# Patient Record
Sex: MALE | URBAN - METROPOLITAN AREA
[De-identification: names, ages, dates, MRNs, and addresses within clinical notes are randomized per-mention and may not be internally consistent; named-entity substitution may affect disease eponyms.]

---

## 2018-02-17 ENCOUNTER — IMPORTED ENCOUNTER (OUTPATIENT)
Dept: URBAN - METROPOLITAN AREA CLINIC 1 | Facility: CLINIC | Age: 68
End: 2018-02-17

## 2018-02-17 PROBLEM — H25.812: Noted: 2018-02-17

## 2018-02-17 PROBLEM — H43.811: Noted: 2018-02-17

## 2018-02-17 PROBLEM — Z96.1: Noted: 2018-02-17

## 2018-02-17 PROBLEM — H26.491: Noted: 2018-02-17

## 2018-02-17 PROBLEM — H40.053: Noted: 2018-02-17

## 2018-02-17 PROCEDURE — 92015 DETERMINE REFRACTIVE STATE: CPT

## 2018-02-17 PROCEDURE — 92004 COMPRE OPH EXAM NEW PT 1/>: CPT

## 2018-02-17 NOTE — PATIENT DISCUSSION
1.  Cataract OS: Visually Significant secondary to glare discussed the risks benefits alternatives and limitations of cataract surgery. The patient stated a full understanding and a desire to proceed with the procedure. The patient will need to return for preop appointment with cataract measurements and to have any additional questions answered and start pre-operative eye drops as directed. Phaco PCL OS (Otherwise follow-up 6 mo 10 glare) 2. PCO OD- Visually significant secondary to glare; Schedule YAG Cap OD. Pros cons risks and benefits. 3.  Pseudophakia OD - (SN60WF 23.5- [place]) 4. PVD w/o Tear OD - RD precautions. 5.  OHTN OU (CD .35/. 3) IOP borderline. Re-evaluate after Phaco. Return for an appointment with Dr. Dirk Colorado for AS/HP.  Schedule YAG Cap OD on Ascan day! (Tickler sent to [facility])

## 2018-03-05 ENCOUNTER — OFFICE VISIT (OUTPATIENT)
Dept: FAMILY MEDICINE CLINIC | Age: 68
End: 2018-03-05

## 2018-03-05 VITALS
DIASTOLIC BLOOD PRESSURE: 93 MMHG | RESPIRATION RATE: 16 BRPM | HEART RATE: 71 BPM | HEIGHT: 60 IN | OXYGEN SATURATION: 96 % | SYSTOLIC BLOOD PRESSURE: 136 MMHG | WEIGHT: 129 LBS | BODY MASS INDEX: 25.32 KG/M2 | TEMPERATURE: 96.2 F

## 2018-03-05 DIAGNOSIS — M25.512 CHRONIC PAIN OF BOTH SHOULDERS: Primary | ICD-10-CM

## 2018-03-05 DIAGNOSIS — M25.511 CHRONIC PAIN OF BOTH SHOULDERS: Primary | ICD-10-CM

## 2018-03-05 DIAGNOSIS — F17.200 SMOKER: ICD-10-CM

## 2018-03-05 DIAGNOSIS — G89.29 CHRONIC PAIN OF BOTH SHOULDERS: Primary | ICD-10-CM

## 2018-03-05 DIAGNOSIS — M62.838 MUSCLE SPASM: ICD-10-CM

## 2018-03-05 DIAGNOSIS — M51.36 DEGENERATIVE DISC DISEASE, LUMBAR: ICD-10-CM

## 2018-03-05 RX ORDER — METHOCARBAMOL 500 MG/1
500 TABLET, FILM COATED ORAL
Qty: 30 TAB | Refills: 2 | Status: SHIPPED | OUTPATIENT
Start: 2018-03-05 | End: 2018-03-09

## 2018-03-05 RX ORDER — CLOPIDOGREL BISULFATE 75 MG/1
TABLET ORAL
COMMUNITY
End: 2018-05-23 | Stop reason: SDUPTHER

## 2018-03-05 RX ORDER — HYDROCODONE BITARTRATE AND ACETAMINOPHEN 7.5; 325 MG/1; MG/1
1 TABLET ORAL
Qty: 6 TAB | Refills: 0 | Status: SHIPPED | OUTPATIENT
Start: 2018-03-05 | End: 2018-03-09

## 2018-03-05 NOTE — PATIENT INSTRUCTIONS
For the shoulders, I've referred you to orthopedics. If you don't hear from them, let me know. In the meantime, I've given you a couple of pain pills to use when absolutely necessary. I have also prescribed a muscle relaxer, take it as directed. Recheck as needed. If you stop smoking it will also help your joints and back.

## 2018-03-05 NOTE — PROGRESS NOTES
HISTORY OF PRESENT ILLNESS  Baljeet Dawson is a 77 y.o. male. HPI Comments: Mr. Noel Becker is here today for an urgent care visit because of bad shoulder pain. He's had problems for years, many years ago he had a cortisone injection that helped a lot and would like another one. Both shoulders hurt but the R is worse than the L. He's been taking Motrin 800 when the pain gets bad. He also has chronic back pain. He's had surgery and after that he's had 4 epidurals. Other than mentioning it, he doesn't need anything for it. He asks for a muscle relaxer. His muscles get sore in his shoulders, arms and back. He smokes 1/2 pack per day (40 years). Shoulder Pain          Review of Systems   Constitutional: Negative for chills and fever. Eyes: Negative for blurred vision and double vision. Respiratory: Negative for cough and shortness of breath. Cardiovascular: Negative for chest pain and palpitations. Gastrointestinal: Negative for abdominal pain, nausea and vomiting. Genitourinary: Negative for dysuria. Musculoskeletal: Positive for back pain, joint pain (shoulders) and myalgias. Neurological: Negative for headaches. Physical Exam   Constitutional: He is oriented to person, place, and time. He appears well-developed and well-nourished. Eyes: Pupils are equal, round, and reactive to light. Neck: Neck supple. Cardiovascular: Normal rate and regular rhythm. Pulmonary/Chest: Effort normal and breath sounds normal. No respiratory distress. He has no wheezes. He has no rales. Abdominal: Soft. He exhibits no distension. Musculoskeletal:   R shoulder tender anteriorly, decreased ROM. Lymphadenopathy:     He has no cervical adenopathy. Neurological: He is alert and oriented to person, place, and time. Nursing note and vitals reviewed. ASSESSMENT and PLAN    ICD-10-CM ICD-9-CM    1.  Chronic pain of both shoulders M25.511 719.41 REFERRAL TO ORTHOPEDICS    G89.29 338.29 HYDROcodone-acetaminophen (NORCO) 7.5-325 mg per tablet    M25.512     2. Degenerative disc disease, lumbar M51.36 722.52 methocarbamol (ROBAXIN) 500 mg tablet   3. Muscle spasm M62.838 728.85 methocarbamol (ROBAXIN) 500 mg tablet   4. Smoker F17.200 305.1        Refer to ortho. Encouraged to quit smoking.     AVS instructions reviewed with patient, pt verbalized understanding

## 2018-03-05 NOTE — PROGRESS NOTES
rm :1    Chief Complaint   Patient presents with    Shoulder Pain     bilateral pain in shoulder that has been ocurring for the last 2 months, more so in the right     Flu Shot Requested: no    Depression Screening:  PHQ over the last two weeks 3/5/2018   Little interest or pleasure in doing things Not at all   Feeling down, depressed or hopeless Not at all   Total Score PHQ 2 0       Learning Assessment:  Learning Assessment 3/5/2018   PRIMARY LEARNER Patient   HIGHEST LEVEL OF EDUCATION - PRIMARY LEARNER  DID NOT GRADUATE HIGH SCHOOL   PRIMARY LANGUAGE ENGLISH   LEARNER PREFERENCE PRIMARY READING   ANSWERED BY patient   RELATIONSHIP SELF       Abuse Screening:  No flowsheet data found. Health Maintenance reviewed and discussed per provider: yes     Coordination of Care:    1. Have you been to the ER, urgent care clinic since your last visit? Hospitalized since your last visit? no    2. Have you seen or consulted any other health care providers outside of the [address] [place] [address] since your last visit? Include any pap smears or colon screening.  no

## 2018-03-05 NOTE — MR AVS SNAPSHOT
[address] [place] [address] [phone] [id number] [phone] Patient: Diann Young MRN: [id number] WLL:[birth year] Visit Information Date & Time Provider Department Dept. Phone Encounter #  
 3/5/2018  1:30 PM Oriana Huang, [facility] [phone] [id number] Upcoming Health Maintenance Date Due DTaP/Tdap/Td series (1 - Tdap) 2/20/1969 FOBT Q 1 YEAR AGE 50-75 2/20/1998 ZOSTER VACCINE AGE 60> 12/20/2007 GLAUCOMA SCREENING Q2Y 2/20/2013 Pneumococcal 65+ Low/Medium Risk (1 of 2 - PCV13) 2/20/2013 MEDICARE YEARLY EXAM 2/20/2013 Influenza Age 5 to Adult 8/1/2017 Allergies as of 3/5/2018  Review Complete On: 3/5/2018 By: Oriana Huang MD  
  
 Severity Noted Reaction Type Reactions Keflex [Cephalexin]  03/05/2018    Hives Morphine  03/05/2018    Hives Current Immunizations  Never Reviewed No immunizations on file. Not reviewed this visit You Were Diagnosed With   
  
 Codes Comments Chronic pain of both shoulders    -  Primary ICD-10-CM: M25.511, G89.29, M25.512 ICD-9-CM: 719.41, 338.29 Degenerative disc disease, lumbar     ICD-10-CM: M51.36 
ICD-9-CM: 722.52 Muscle spasm     ICD-10-CM: D58.433 ICD-9-CM: 728.85 Smoker     ICD-10-CM: N60.521 ICD-9-CM: 305.1 Vitals BP Pulse Temp Resp Height(growth percentile) Weight(growth percentile) (!) 136/93 (BP 1 Location: Left arm, BP Patient Position: Sitting) 71 96.2 °F (35.7 °C) (Oral) 16 4' 9\" (1.448 m) 129 lb (58.5 kg) SpO2 BMI Smoking Status 96% 27.92 kg/m2 Current Every Day Smoker Vitals History BMI and BSA Data Body Mass Index Body Surface Area  
 27.92 kg/m 2 1.53 m 2 Preferred Pharmacy Pharmacy Name Phone [facility] [address] [place] [address] Arlene Mckenzie Your Updated Medication List  
  
   
 This list is accurate as of 3/5/18  2:47 PM.  Always use your most recent med list.  
  
  
  
  
 HYDROcodone-acetaminophen 7.5-325 mg per tablet Commonly known as:  Mertie Moran Take 1 Tab by mouth daily as needed for Pain. methocarbamol 500 mg tablet Commonly known as:  ROBAXIN Take 1 Tab by mouth three (3) times daily as needed. PLAVIX 75 mg Tab Generic drug:  clopidogrel Take  by mouth. Prescriptions Printed Refills HYDROcodone-acetaminophen (NORCO) 7.5-325 mg per tablet 0 Sig: Take 1 Tab by mouth daily as needed for Pain. Class: Print Route: Oral  
  
Prescriptions Sent to Pharmacy Refills  
 methocarbamol (ROBAXIN) 500 mg tablet 2 Sig: Take 1 Tab by mouth three (3) times daily as needed. Class: Normal  
 Pharmacy: [facility] [address] [place] [address] #: [phone] Route: Oral  
  
We Performed the Following REFERRAL TO ORTHOPEDICS [DAL050 Custom] Referral Information Referral ID Referred By Referred To  
  
 [id number] STEF [address] [place] [address], MD   
   [address] [place] [address]   
   Hannah Arguello, [address] Phone: [phone] Fax: [phone] Visits Status Start Date End Date 1 New Request 3/5/18 3/5/19 If your referral has a status of pending review or denied, additional information will be sent to support the outcome of this decision. Patient Instructions For the shoulders, I've referred you to orthopedics. If you don't hear from them, let me know. In the meantime, I've given you a couple of pain pills to use when absolutely necessary. I have also prescribed a muscle relaxer, take it as directed. Recheck as needed. If you stop smoking it will also help your joints and back. Introducing [facility]!    
 [facility] introduces [employer] patient portal. Now you can access parts of your medical record, email your doctor's office, and request medication refills online. 1. In your internet browser, go to [email or web address] 2. Click on the First Time User? Click Here link in the Sign In box. You will see the New Member Sign Up page. 3. Enter your SlideMail Access Code exactly as it appears below. You will not need to use this code after youve completed the sign-up process. If you do not sign up before the expiration date, you must request a new code. · SlideMail Access Code: [email or web address] Expires: 6/3/2018  2:47 PM 
 
4. Enter the last four digits of your Social Security Number (xxxx) and Date of Birth (mm/dd/yyyy) as indicated and click Submit. You will be taken to the next sign-up page. 5. Create a SlideMail ID. This will be your SlideMail login ID and cannot be changed, so think of one that is secure and easy to remember. 6. Create a SlideMail password. You can change your password at any time. 7. Enter your Password Reset Question and Answer. This can be used at a later time if you forget your password. 8. Enter your e-mail address. You will receive e-mail notification when new information is available in [address] 9. Click Sign Up. You can now view and download portions of your medical record. 10. Click the Download Summary menu link to download a portable copy of your medical information. If you have questions, please visit the Frequently Asked Questions section of the SlideMail website. Remember, SlideMail is NOT to be used for urgent needs. For medical emergencies, dial 911. Now available from your iPhone and Android! Please provide this summary of care documentation to your next provider. If you have any questions after today's visit, please call [phone].

## 2018-03-06 ENCOUNTER — IMPORTED ENCOUNTER (OUTPATIENT)
Dept: URBAN - METROPOLITAN AREA CLINIC 1 | Facility: CLINIC | Age: 68
End: 2018-03-06

## 2018-03-06 PROCEDURE — 92136 OPHTHALMIC BIOMETRY: CPT

## 2018-03-06 PROCEDURE — 66821 AFTER CATARACT LASER SURGERY: CPT

## 2018-03-06 NOTE — PATIENT DISCUSSION
1.  Cataract OS:  Visually Significant secondary to glare discussed the risks benefits alternatives and limitations of cataract surgery. The patient stated a full understanding and a desire to proceed with the procedure. Instructed/ discussed with patient if gtts are expensive (over 150 dollars) to call our office so we can recommend alternatives. Pt understood. and Pt understands they will need glasses post-op to achieve their best corrected vision. Phaco PCL2. YAG CAP OD: (Consent signed and scanned into attachments) 1 gtt Prolensa applied. The purpose and nature of the procedure possible alternative methods of treatment the risks involved and the possibility of complications were discussed with patient. The Patient wishes to proceed and the consent was signed. The laser was then performed under topical anesthesia with no complications. Post op instructions were given to patient as well as a follow-up appointment. Patient was advised to call our office if any questions or concerns.  F/u as scheduled

## 2018-03-08 PROBLEM — H25.812: Noted: 2018-03-08

## 2018-03-08 PROBLEM — H26.491: Noted: 2018-03-06

## 2018-03-14 ENCOUNTER — IMPORTED ENCOUNTER (OUTPATIENT)
Dept: URBAN - METROPOLITAN AREA CLINIC 1 | Facility: CLINIC | Age: 68
End: 2018-03-14

## 2018-03-14 PROBLEM — H25.812 COMBINED FORM OF SENILE CATARACT OF LEFT EYE: Status: ACTIVE | Noted: 2018-03-14

## 2018-03-14 PROBLEM — H25.812 COMBINED FORM OF SENILE CATARACT OF LEFT EYE: Status: RESOLVED | Noted: 2018-03-14 | Resolved: 2018-03-14

## 2018-03-15 ENCOUNTER — IMPORTED ENCOUNTER (OUTPATIENT)
Dept: URBAN - METROPOLITAN AREA CLINIC 1 | Facility: CLINIC | Age: 68
End: 2018-03-15

## 2018-03-15 PROBLEM — Z96.1: Noted: 2018-03-15

## 2018-03-15 PROCEDURE — 99024 POSTOP FOLLOW-UP VISIT: CPT

## 2018-03-15 NOTE — PATIENT DISCUSSION
POD#1 CE/IOL OS (Standard) doing well. Continue all 3 gtts as prescribed and until gone. Use Lotemax BID OS Prolensa Qdaily OS Ocuflox TID OS Begin Combigan BID OS (Sample given) 1 gtt Combigan applied OS prior to leaving. Written instructions given and explained. Patient having FBS OD>OS.  Use ATs TID OU Routinely (sample of Blink given) Post op Warnings Reiterated RTC as scheduled

## 2018-03-16 ENCOUNTER — OFFICE VISIT (OUTPATIENT)
Dept: ORTHOPEDIC SURGERY | Facility: CLINIC | Age: 68
End: 2018-03-16

## 2018-03-16 VITALS
HEART RATE: 55 BPM | HEIGHT: 60 IN | WEIGHT: 122 LBS | OXYGEN SATURATION: 97 % | SYSTOLIC BLOOD PRESSURE: 133 MMHG | DIASTOLIC BLOOD PRESSURE: 83 MMHG | BODY MASS INDEX: 23.95 KG/M2 | TEMPERATURE: 97.5 F

## 2018-03-16 DIAGNOSIS — R20.2 NUMBNESS AND TINGLING IN BOTH HANDS: ICD-10-CM

## 2018-03-16 DIAGNOSIS — G89.29 CHRONIC LEFT SHOULDER PAIN: ICD-10-CM

## 2018-03-16 DIAGNOSIS — M75.22 BICEPS TENDONITIS OF BOTH SHOULDERS: ICD-10-CM

## 2018-03-16 DIAGNOSIS — M19.011 PRIMARY OSTEOARTHRITIS OF RIGHT SHOULDER: ICD-10-CM

## 2018-03-16 DIAGNOSIS — Z98.1 S/P LUMBAR FUSION: ICD-10-CM

## 2018-03-16 DIAGNOSIS — M25.512 CHRONIC LEFT SHOULDER PAIN: ICD-10-CM

## 2018-03-16 DIAGNOSIS — M75.52 CHRONIC SHOULDER BURSITIS, LEFT: ICD-10-CM

## 2018-03-16 DIAGNOSIS — R20.0 NUMBNESS AND TINGLING IN BOTH HANDS: ICD-10-CM

## 2018-03-16 DIAGNOSIS — M75.21 BICEPS TENDONITIS OF BOTH SHOULDERS: ICD-10-CM

## 2018-03-16 DIAGNOSIS — M25.511 CHRONIC RIGHT SHOULDER PAIN: ICD-10-CM

## 2018-03-16 DIAGNOSIS — G89.29 CHRONIC RIGHT SHOULDER PAIN: ICD-10-CM

## 2018-03-16 DIAGNOSIS — M75.51 CHRONIC SHOULDER BURSITIS, RIGHT: ICD-10-CM

## 2018-03-16 DIAGNOSIS — M19.012 PRIMARY OSTEOARTHRITIS OF LEFT SHOULDER: Primary | ICD-10-CM

## 2018-03-16 RX ORDER — BETAMETHASONE SODIUM PHOSPHATE AND BETAMETHASONE ACETATE 3; 3 MG/ML; MG/ML
6 INJECTION, SUSPENSION INTRA-ARTICULAR; INTRALESIONAL; INTRAMUSCULAR; SOFT TISSUE ONCE
Qty: 0.5 ML | Refills: 0
Start: 2018-03-16 | End: 2018-03-16

## 2018-03-16 RX ORDER — BUPIVACAINE HYDROCHLORIDE 2.5 MG/ML
8 INJECTION, SOLUTION EPIDURAL; INFILTRATION; INTRACAUDAL ONCE
Qty: 8 ML | Refills: 0
Start: 2018-03-16 | End: 2018-03-16

## 2018-03-16 NOTE — PROGRESS NOTES
Patient: Diann Young                MRN: [id number]       SSN: [id number]  YOB: 1950        AGE: 77 y.o. SEX: male    PCP: None  03/16/18    Chief Complaint   Patient presents with    Shoulder Pain     Bilateral shoulder pain     HISTORY:  Diann Young is a 77 y.o. male who is seen for bilateral shoulder R>L and hand pain. He reports shoulder pain for some time with no history of injury. He notes numbness and tingling in both hands. He reports a h/o carpal tunnel syndrome. He states he has pain in his wrist and hands. He notes pain radiating up to his right elbow. He states his right shoulder pain radiates into his pectoralis area. He reports increased pain at night and difficulty sleeping on his side. He is s/p lumbar fusion 18 years ago by Dr. Sasha George. He reports relief from a previous right shoulder cortisone injection in [place]. 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 He stopped working after his lumbar fusion 18 years ago. He is originally from [place] and also lived in [place] for many years. She moved to this area 2 years ago to be closer to his ill sister. He lives in [place] with his wife. He has four children- 1 daughter and 3 sons. He has one son that lives in the area. He has 15 grandchildren and 10 great-grandchildren. Current weight is 122 pounds. He is 4'9\" tall. He is hypertensive. He is not diabetic. He is right handed.      No results found for: HBA1C, HGBE8, BUU6FDBT, GJR8WUBH, WQQ7UTTB  Weight Metrics 3/16/2018 3/14/2018 3/9/2018 3/5/2018   Weight 122 lb - 118 lb 129 lb   BMI 26.4 kg/m2 25.53 kg/m2 - 27.92 kg/m2       Patient Active Problem List   Diagnosis Code   (none) - all problems resolved or deleted     REVIEW OF SYSTEMS: All Below are Negative except: See HPI   Constitutional: negative for fever, chills, and weight loss. Cardiovascular: negative for chest pain, claudication, leg swelling, SOB, JAIN   Gastrointestinal: Negative for pain, N/V/C/D, Blood in stool or urine, dysuria,  hematuria, incontinence, pelvic pain. Musculoskeletal: See HPI   Neurological: Negative for dizziness and weakness. Negative for headaches, Visual changes, confusion, seizures   Phychiatric/Behavioral: Negative for depression, memory loss, substance  abuse. Extremities: Negative for hair changes, rash, or skin lesion changes. Hematologic: Negative for bleeding problems, bruising, pallor or swollen lymph  nodes   Peripheral Vascular: No calf pain, no circulation deficits. Social History     Social History    Marital status: UNKNOWN     Spouse name: N/A    Number of children: N/A    Years of education: N/A     Occupational History    Not on file. Social History Main Topics    Smoking status: Current Every Day Smoker     Packs/day: 0.50     Years: 40.00    Smokeless tobacco: Never Used    Alcohol use No    Drug use: No    Sexual activity: No     Other Topics Concern    Not on file     Social History Narrative      Allergies   Allergen Reactions    Keflex [Cephalexin] Hives    Morphine Hives      Current Outpatient Prescriptions   Medication Sig    betamethasone (CELESTONE SOLUSPAN) 6 mg/mL injection 1 mL by Intra artICUlar route once for 1 dose.  bupivacaine, PF, (MARCAINE, PF,) 0.25 % (2.5 mg/mL) injection 8 mL by Intra artICUlar route once for 1 dose.  METOPROLOL SUCCINATE PO Take  by mouth.  LISINOPRIL PO Take  by mouth.  ROSUVASTATIN CALCIUM (CRESTOR PO) Take  by mouth.  OMEPRAZOLE PO Take  by mouth.  TAMSULOSIN HCL (TAMSULOSIN PO) Take  by mouth.     TIOTROPIUM BR/OLODATEROL HCL (STIOLTO RESPIMAT IN) Take by inhalation.  albuterol sulfate (PROVENTIL;VENTOLIN) 2.5 mg/0.5 mL nebu nebulizer solution by Nebulization route once.  clopidogrel (PLAVIX) 75 mg tab Take  by mouth. No current facility-administered medications for this visit. PHYSICAL EXAMINATION:  Visit Vitals    /83 (BP 1 Location: Left arm, BP Patient Position: Sitting)    Pulse (!) 55    Temp 97.5 °F (36.4 °C)    Ht 4' 9\" (1.448 m)    Wt 122 lb (55.3 kg)    SpO2 97%    BMI 26.4 kg/m2      ORTHO EXAMINATION:  Examination Right shoulder Left shoulder   Skin Intact Intact   Effusion - -   Biceps deformity - -   Atrophy - -   AC joint tenderness - -   Acromial tenderness +, anterolateral +, anterolateral   Biceps tenderness - -   Forward flexion/Elevation  165   Active abduction  165   External rotation ROM 15 15   Internal rotation ROM 85 85   Apprehension - -   Impingement - -   Drop Arm Test - -   Neurovascular Intact Intact   Painful arc of motion beyond 90  Examination Right Hand Left Hand   Skin Intact Intact   Deformity - -   Swelling - -   Tenderness - -   Finger flexion Full Full   Finger extension Full Full   Sensation Normal Normal   Capillary refill Normal Normal   Heberden's nodes + +   Dupuytren's - -     PROCEDURE:  After discussing treatment options, patient's shoulders were injected with 4 cc Marcaine and 1/2 cc Celestone.     Chart reviewed for the following:   Joana Louise MD, have reviewed the History, Physical and updated the Allergic reactions for [place] [address] performed immediately prior to start of procedure:  Joana Louise MD, have performed the following reviews on Orval Netters prior to the start of the procedure:            * Patient was identified by name and date of birth   * Agreement on procedure being performed was verified  * Risks and Benefits explained to the patient  * Procedure site verified and marked as necessary  * Patient was positioned for comfort  * Consent was obtained     Time: 2:48 PM     Date of procedure: 3/16/2018    Procedure performed by:  Gretta Saucedo MD    Mr. Daxa Rosenberg tolerated the procedure well with no complications. RADIOGRAPHS:  XR BILATERAL SHOULDER 3/16/18  IMPRESSION:  Three views - No fractures, no acromioclavicular narrowing, mild glenohumeral narrowing, no calcific densities. Small inferior glenohumeral and humeral head osteophytes. IMPRESSION:      ICD-10-CM ICD-9-CM    1. Primary osteoarthritis of left shoulder M19.012 715.11 betamethasone (CELESTONE SOLUSPAN) 6 mg/mL injection      BETAMETHASONE ACETATE & SODIUM PHOSPHATE INJECTION 3 MG EA.      DRAIN/INJECT LARGE JOINT/BURSA      bupivacaine, PF, (MARCAINE, PF,) 0.25 % (2.5 mg/mL) injection      REFERRAL TO PHYSICAL THERAPY    mild   2. Chronic right shoulder pain M25.511 719.41 AMB POC XRAY, SHOULDER; COMPLETE, 2+    G89.29 338.29 betamethasone (CELESTONE SOLUSPAN) 6 mg/mL injection      BETAMETHASONE ACETATE & SODIUM PHOSPHATE INJECTION 3 MG EA.      DRAIN/INJECT LARGE JOINT/BURSA      bupivacaine, PF, (MARCAINE, PF,) 0.25 % (2.5 mg/mL) injection      REFERRAL TO PHYSICAL THERAPY   3. Chronic left shoulder pain M25.512 719.41 AMB POC XRAY, SHOULDER; COMPLETE, 2+    G89.29 338.29 betamethasone (CELESTONE SOLUSPAN) 6 mg/mL injection      BETAMETHASONE ACETATE & SODIUM PHOSPHATE INJECTION 3 MG EA.      DRAIN/INJECT LARGE JOINT/BURSA      bupivacaine, PF, (MARCAINE, PF,) 0.25 % (2.5 mg/mL) injection      REFERRAL TO PHYSICAL THERAPY   4. Numbness and tingling in both hands R20.0 782. 0 EMG ONE EXTREMITY UPPER RT    R20.2  NCV/LAT SENSORY PER NERVE UP/RT   5. S/P lumbar fusion Z98.1 V45.4    6.  Primary osteoarthritis of right shoulder M19.011 715.11 betamethasone (CELESTONE SOLUSPAN) 6 mg/mL injection      BETAMETHASONE ACETATE & SODIUM PHOSPHATE INJECTION 3 MG EA.      DRAIN/INJECT LARGE JOINT/BURSA      bupivacaine, PF, (MARCAINE, PF,) 0.25 % (2.5 mg/mL) injection      REFERRAL TO PHYSICAL THERAPY    mild   7. Chronic shoulder bursitis, right M75.51 726.10 betamethasone (CELESTONE SOLUSPAN) 6 mg/mL injection      BETAMETHASONE ACETATE & SODIUM PHOSPHATE INJECTION 3 MG EA.      DRAIN/INJECT LARGE JOINT/BURSA      bupivacaine, PF, (MARCAINE, PF,) 0.25 % (2.5 mg/mL) injection      REFERRAL TO PHYSICAL THERAPY   8. Chronic shoulder bursitis, left M75.52 726.10 betamethasone (CELESTONE SOLUSPAN) 6 mg/mL injection      BETAMETHASONE ACETATE & SODIUM PHOSPHATE INJECTION 3 MG EA.      DRAIN/INJECT LARGE JOINT/BURSA      bupivacaine, PF, (MARCAINE, PF,) 0.25 % (2.5 mg/mL) injection      REFERRAL TO PHYSICAL THERAPY   9. Biceps tendonitis of both shoulders M75.21 726.12 betamethasone (CELESTONE SOLUSPAN) 6 mg/mL injection    M75.22  BETAMETHASONE ACETATE & SODIUM PHOSPHATE INJECTION 3 MG EA.      DRAIN/INJECT LARGE JOINT/BURSA      bupivacaine, PF, (MARCAINE, PF,) 0.25 % (2.5 mg/mL) injection      REFERRAL TO PHYSICAL THERAPY     PLAN:  After discussing treatment options, patient's shoulders were injected with 4 cc Marcaine and 1/2 cc Celestone. He will follow up in 5 weeks with the results of her RUE EMG/NCV study. He will start a brief course of outpatient physical therapy to his shoulders.       Scribed by Yen Pacheco ([address]) as dictated by Brenda Vick MD

## 2018-03-16 NOTE — PATIENT INSTRUCTIONS
Shoulder Bursitis: Exercises  Your Care Instructions  Here are some examples of typical rehabilitation exercises for your condition. Start each exercise slowly. Ease off the exercise if you start to have pain. Your doctor or physical therapist will tell you when you can start these exercises and which ones will work best for you. How to do the exercises  Posterior stretching exercise    1. Hold the elbow of your injured arm with your other hand. 2. Use your hand to pull your injured arm gently up and across your body. You will feel a gentle stretch across the back of your injured shoulder. 3. Hold for at least 15 to 30 seconds. Then slowly lower your arm. 4. Repeat 2 to 4 times. Up-the-back stretch    Your doctor or physical therapist may want you to wait to do this stretch until you have regained most of your range of motion and strength. You can do this stretch in different ways. Hold any of these stretches for at least 15 to 30 seconds. Repeat them 2 to 4 times. 1. Put your hand in your back pocket. Let it rest there to stretch your shoulder. 2. With your other hand, hold your injured arm (palm outward) behind your back by the wrist. Pull your arm up gently to stretch your shoulder. 3. Next, put a towel over your other shoulder. Put the hand of your injured arm behind your back. Now hold the back end of the towel. With the other hand, hold the front end of the towel in front of your body. Pull gently on the front end of the towel. This will bring your hand farther up your back to stretch your shoulder. Overhead stretch    1. Standing about an arm's length away, grasp onto a solid surface. You could use a countertop, a doorknob, or the back of a sturdy chair. 2. With your knees slightly bent, bend forward with your arms straight. Lower your upper body, and let your shoulders stretch. 3. As your shoulders are able to stretch farther, you may need to take a step or two backward.   4. Hold for at least 15 to 30 seconds. Then stand up and relax. If you had stepped back during your stretch, step forward so you can keep your hands on the solid surface. 5. Repeat 2 to 4 times. Shoulder flexion (lying down)    To make a wand for this exercise, use a piece of PVC pipe or a broom handle with the broom removed. Make the wand about a foot wider than your shoulders. 1. Lie on your back, holding a wand with both hands. Your palms should face down as you hold the wand. 2. Keeping your elbows straight, slowly raise your arms over your head. Raise them until you feel a stretch in your shoulders, upper back, and chest.  3. Hold for 15 to 30 seconds. 4. Repeat 2 to 4 times. Shoulder rotation (lying down)    To make a wand for this exercise, use a piece of PVC pipe or a broom handle with the broom removed. Make the wand about a foot wider than your shoulders. 1. Lie on your back. Hold a wand with both hands with your elbows bent and palms up. 2. Keep your elbows close to your body, and move the wand across your body toward the sore arm. 3. Hold for 8 to 12 seconds. 4. Repeat 2 to 4 times. Shoulder blade squeeze    1. Stand with your arms at your sides, and squeeze your shoulder blades together. Do not raise your shoulders up as you squeeze. 2. Hold 6 seconds. 3. Repeat 8 to 12 times. Shoulder flexor and extensor exercise    These are isometric exercises. That means you contract your muscles without actually moving. 1. Push forward (flex): Stand facing a wall or doorjamb, about 6 inches or less back. Hold your injured arm against your body. Make a closed fist with your thumb on top. Then gently push your hand forward into the wall with about 25% to 50% of your strength. Don't let your body move backward as you push. Hold for about 6 seconds. Relax for a few seconds. Repeat 8 to 12 times. 2. Push backward (extend): Stand with your back flat against a wall.  Your upper arm should be against the wall, with your elbow bent 90 degrees (your hand straight ahead). Push your elbow gently back against the wall with about 25% to 50% of your strength. Don't let your body move forward as you push. Hold for about 6 seconds. Relax for a few seconds. Repeat 8 to 12 times. Scapular exercise: Wall push-ups    This exercise is best done with your fingers somewhat turned out, rather than straight up and down. 1. Stand facing a wall, about 12 inches to 18 inches away. 2. Place your hands on the wall at shoulder height. 3. Slowly bend your elbows and bring your face to the wall. Keep your back and hips straight. 4. Push back to where you started. 5. Repeat 8 to 12 times. 6. When you can do this exercise against a wall comfortably, you can try it against a counter. You can then slowly progress to the end of a couch, then to a sturdy chair, and finally to the floor. Scapular exercise: Retraction    For this exercise, you will need elastic exercise material, such as surgical tubing or Thera-Band. 1. Put the band around a solid object at about waist level. (A bedpost will work well.) Each hand should hold an end of the band. 2. With your elbows at your sides and bent to 90 degrees, pull the band back. Your shoulder blades should move toward each other. Then move your arms back where you started. 3. Repeat 8 to 12 times. 4. If you have good range of motion in your shoulders, try this exercise with your arms lifted out to the sides. Keep your elbows at a 90-degree angle. Raise the elastic band up to about shoulder level. Pull the band back to move your shoulder blades toward each other. Then move your arms back where you started. Internal rotator strengthening exercise    1. Start by tying a piece of elastic exercise material to a doorknob. You can use surgical tubing or Thera-Band. 2. Stand or sit with your shoulder relaxed and your elbow bent 90 degrees. Your upper arm should rest comfortably against your side.  Squeeze a rolled towel between your elbow and your body for comfort. This will help keep your arm at your side. 3. Hold one end of the elastic band in the hand of the painful arm. 4. Slowly rotate your forearm toward your body until it touches your belly. Slowly move it back to where you started. 5. Keep your elbow and upper arm firmly tucked against the towel roll or at your side. 6. Repeat 8 to 12 times. External rotator strengthening exercise    1. Start by tying a piece of elastic exercise material to a doorknob. You can use surgical tubing or Thera-Band. (You may also hold one end of the band in each hand.)  2. Stand or sit with your shoulder relaxed and your elbow bent 90 degrees. Your upper arm should rest comfortably against your side. Squeeze a rolled towel between your elbow and your body for comfort. This will help keep your arm at your side. 3. Hold one end of the elastic band with the hand of the painful arm. 4. Start with your forearm across your belly. Slowly rotate the forearm out away from your body. Keep your elbow and upper arm tucked against the towel roll or the side of your body until you begin to feel tightness in your shoulder. Slowly move your arm back to where you started. 5. Repeat 8 to 12 times. Follow-up care is a key part of your treatment and safety. Be sure to make and go to all appointments, and call your doctor if you are having problems. It's also a good idea to know your test results and keep a list of the medicines you take. Where can you learn more? Go to http://blanca-zbigniew.info/. Enter A848 in the search box to learn more about \"Shoulder Bursitis: Exercises. \"  Current as of: March 21, 2017  Content Version: 11.4  © 8012-4070 [employer]. Care instructions adapted under license by [employer] (which disclaims liability or warranty for this information).  If you have questions about a medical condition or this instruction, always ask your healthcare professional. [address] any warranty or liability for your use of this information. Joint Injections: Care Instructions  Your Care Instructions  Joint injections are shots into a joint, such as the knee. They may be used to put in medicines, such as pain relievers. Or they can be used to take out fluid. Sometimes the fluid is tested in a lab. This can help find the cause of a joint problem. A corticosteroid, or steroid, shot is used to reduce inflammation in tendons or joints. It is often used to treat problems such as arthritis, tendinitis, and bursitis. Steroids can be injected directly into a painful, inflamed joint. They can also help reduce inflammation of a bursa. A bursa is a sac of fluid. It cushions and lubricates areas where tendons, ligaments, skin, muscles, or bones rub against each other. A steroid shot can sometimes help with short-term pain relief when other treatments haven't worked. If steroid shots help, pain may improve for weeks or months. Follow-up care is a key part of your treatment and safety. Be sure to make and go to all appointments, and call your doctor if you are having problems. It's also a good idea to know your test results and keep a list of the medicines you take. How can you care for yourself at home? · Put ice or a cold pack on the area for 10 to 20 minutes at a time. Put a thin cloth between the ice and your skin. · Take anti-inflammatory medicines to reduce pain, swelling, or inflammation. These include ibuprofen (Advil, Motrin) and naproxen (Aleve). Read and follow all instructions on the label. · Avoid strenuous activities for several days, especially those that put stress on the area where you got the shot. · If you have dressings over the area, keep them clean and dry. You may remove them when your doctor tells you to. When should you call for help?   Call your doctor now or seek immediate medical care if:  ? · You have signs of infection, such as:  ¨ Increased pain, swelling, warmth, or redness. ¨ Red streaks leading from the site. ¨ Pus draining from the site. ¨ A fever. ? Watch closely for changes in your health, and be sure to contact your doctor if you have any problems. Where can you learn more? Go to http://blanca-zbigniew.info/. Enter N616 in the search box to learn more about \"Joint Injections: Care Instructions. \"  Current as of: March 21, 2017  Content Version: 11.4  © 9857-7991 [employer]. Care instructions adapted under license by [employer] (which disclaims liability or warranty for this information). If you have questions about a medical condition or this instruction, always ask your healthcare professional. [address] any warranty or liability for your use of this information.

## 2018-03-16 NOTE — MR AVS SNAPSHOT
[address] [place] [address], [address] [facility] [address] [phone] Patient: Di Cummins MRN: [id number] [facility]:1/74/6617 Visit Information Date & Time Provider Department Dept. Phone Encounter #  
 3/16/2018  1:45 PM Pam Ashley, 27 [phone] Follow-up Instructions Return if symptoms worsen or fail to improve. Upcoming Health Maintenance Date Due Hepatitis C Screening 1950 DTaP/Tdap/Td series (1 - Tdap) 2/20/1969 FOBT Q 1 YEAR AGE 50-75 2/20/1998 ZOSTER VACCINE AGE 60> 12/20/2007 GLAUCOMA SCREENING Q2Y 2/20/2013 Pneumococcal 65+ Low/Medium Risk (1 of 2 - PCV13) 2/20/2013 MEDICARE YEARLY EXAM 2/20/2013 Influenza Age 5 to Adult 8/1/2017 Allergies as of 3/16/2018  Review Complete On: 3/16/2018 By: Cari Barnhart Severity Noted Reaction Type Reactions Keflex [Cephalexin]  03/05/2018    Hives Morphine  03/05/2018    Hives Current Immunizations  Never Reviewed No immunizations on file. Not reviewed this visit You Were Diagnosed With   
  
 Codes Comments Primary osteoarthritis of left shoulder    -  Primary ICD-10-CM: M19.012 
ICD-9-CM: 715.11 Chronic right shoulder pain     ICD-10-CM: M25.511, G89.29 ICD-9-CM: 719.41, 338.29 Chronic left shoulder pain     ICD-10-CM: M25.512, G89.29 ICD-9-CM: 719.41, 338.29 Numbness and tingling in both hands     ICD-10-CM: R20.0, R20.2 ICD-9-CM: 782.0 S/P lumbar fusion     ICD-10-CM: Z98.1 ICD-9-CM: V45.4 Primary osteoarthritis of right shoulder     ICD-10-CM: M19.011 
ICD-9-CM: 715.11 Chronic shoulder bursitis, right     ICD-10-CM: M75.51 
ICD-9-CM: 726.10 Chronic shoulder bursitis, left     ICD-10-CM: M75.52 
ICD-9-CM: 726.10 Biceps tendonitis of both shoulders     ICD-10-CM: M75.21, M75.22 
ICD-9-CM: 726.12 Vitals BP Pulse Temp Height(growth percentile) Weight(growth percentile) SpO2  
 133/83 (BP 1 Location: Left arm, BP Patient Position: Sitting) (!) 55 97.5 °F (36.4 °C) 4' 9\" (1.448 m) 122 lb (55.3 kg) 97% BMI Smoking Status 26.4 kg/m2 Current Every Day Smoker BMI and BSA Data Body Mass Index Body Surface Area  
 26.4 kg/m 2 1.49 m 2 Preferred Pharmacy Pharmacy Name Phone [facility] [address] [place] [address] Arlene Mckenzie Your Updated Medication List  
  
   
This list is accurate as of 3/16/18  2:55 PM.  Always use your most recent med list.  
  
  
  
  
 albuterol sulfate 2.5 mg/0.5 mL Nebu nebulizer solution Commonly known as:  PROVENTIL;VENTOLIN  
by Nebulization route once. CRESTOR PO Take  by mouth. LISINOPRIL PO Take  by mouth. METOPROLOL SUCCINATE PO Take  by mouth. OMEPRAZOLE PO Take  by mouth. PLAVIX 75 mg Tab Generic drug:  clopidogrel Take  by mouth. STIOLTO RESPIMAT IN Take  by inhalation. TAMSULOSIN PO Take  by mouth. We Performed the Following AMB POC XRAY, SHOULDER; COMPLETE, 2+ [15415 CPT(R)] AMB POC XRAY, SHOULDER; COMPLETE, 2+ [09200 CPT(R)] Follow-up Instructions Return if symptoms worsen or fail to improve. Patient Instructions Shoulder Bursitis: Exercises Your Care Instructions Here are some examples of typical rehabilitation exercises for your condition. Start each exercise slowly. Ease off the exercise if you start to have pain. Your doctor or physical therapist will tell you when you can start these exercises and which ones will work best for you. How to do the exercises Posterior stretching exercise 1. Hold the elbow of your injured arm with your other hand. 2. Use your hand to pull your injured arm gently up and across your body. You will feel a gentle stretch across the back of your injured shoulder. 3. Hold for at least 15 to 30 seconds. Then slowly lower your arm. 4. Repeat 2 to 4 times. Up-the-back stretch Your doctor or physical therapist may want you to wait to do this stretch until you have regained most of your range of motion and strength. You can do this stretch in different ways. Hold any of these stretches for at least 15 to 30 seconds. Repeat them 2 to 4 times. 1. Put your hand in your back pocket. Let it rest there to stretch your shoulder. 2. With your other hand, hold your injured arm (palm outward) behind your back by the wrist. Pull your arm up gently to stretch your shoulder. 3. Next, put a towel over your other shoulder. Put the hand of your injured arm behind your back. Now hold the back end of the towel. With the other hand, hold the front end of the towel in front of your body. Pull gently on the front end of the towel. This will bring your hand farther up your back to stretch your shoulder. Overhead stretch 1. Standing about an arm's length away, grasp onto a solid surface. You could use a countertop, a doorknob, or the back of a sturdy chair. 2. With your knees slightly bent, bend forward with your arms straight. Lower your upper body, and let your shoulders stretch. 3. As your shoulders are able to stretch farther, you may need to take a step or two backward. 4. Hold for at least 15 to 30 seconds. Then stand up and relax. If you had stepped back during your stretch, step forward so you can keep your hands on the solid surface. 5. Repeat 2 to 4 times. Shoulder flexion (lying down) To make a wand for this exercise, use a piece of PVC pipe or a broom handle with the broom removed. Make the wand about a foot wider than your shoulders. 1. Lie on your back, holding a wand with both hands. Your palms should face down as you hold the wand. 2. Keeping your elbows straight, slowly raise your arms over your head. Raise them until you feel a stretch in your shoulders, upper back, and chest. 
3. Hold for 15 to 30 seconds. 4. Repeat 2 to 4 times. Shoulder rotation (lying down) To make a wand for this exercise, use a piece of PVC pipe or a broom handle with the broom removed. Make the wand about a foot wider than your shoulders. 1. Lie on your back. Hold a wand with both hands with your elbows bent and palms up. 2. Keep your elbows close to your body, and move the wand across your body toward the sore arm. 3. Hold for 8 to 12 seconds. 4. Repeat 2 to 4 times. Shoulder blade squeeze 1. Stand with your arms at your sides, and squeeze your shoulder blades together. Do not raise your shoulders up as you squeeze. 2. Hold 6 seconds. 3. Repeat 8 to 12 times. Shoulder flexor and extensor exercise These are isometric exercises. That means you contract your muscles without actually moving. 1. Push forward (flex): Stand facing a wall or doorjamb, about 6 inches or less back. Hold your injured arm against your body. Make a closed fist with your thumb on top. Then gently push your hand forward into the wall with about 25% to 50% of your strength. Don't let your body move backward as you push. Hold for about 6 seconds. Relax for a few seconds. Repeat 8 to 12 times. 2. Push backward (extend): Stand with your back flat against a wall. Your upper arm should be against the wall, with your elbow bent 90 degrees (your hand straight ahead). Push your elbow gently back against the wall with about 25% to 50% of your strength. Don't let your body move forward as you push. Hold for about 6 seconds. Relax for a few seconds. Repeat 8 to 12 times. Scapular exercise: Wall push-ups This exercise is best done with your fingers somewhat turned out, rather than straight up and down. 1. Stand facing a wall, about 12 inches to 18 inches away. 2. Place your hands on the wall at shoulder height. 3. Slowly bend your elbows and bring your face to the wall. Keep your back and hips straight. 4. Push back to where you started. 5. Repeat 8 to 12 times. 6. When you can do this exercise against a wall comfortably, you can try it against a counter. You can then slowly progress to the end of a couch, then to a sturdy chair, and finally to the floor. Scapular exercise: Retraction For this exercise, you will need elastic exercise material, such as surgical tubing or Thera-Band. 1. Put the band around a solid object at about waist level. (A bedpost will work well.) Each hand should hold an end of the band. 2. With your elbows at your sides and bent to 90 degrees, pull the band back. Your shoulder blades should move toward each other. Then move your arms back where you started. 3. Repeat 8 to 12 times. 4. If you have good range of motion in your shoulders, try this exercise with your arms lifted out to the sides. Keep your elbows at a 90-degree angle. Raise the elastic band up to about shoulder level. Pull the band back to move your shoulder blades toward each other. Then move your arms back where you started. Internal rotator strengthening exercise 1. Start by tying a piece of elastic exercise material to a doorknob. You can use surgical tubing or Thera-Band. 2. Stand or sit with your shoulder relaxed and your elbow bent 90 degrees. Your upper arm should rest comfortably against your side. Squeeze a rolled towel between your elbow and your body for comfort. This will help keep your arm at your side. 3. Hold one end of the elastic band in the hand of the painful arm. 4. Slowly rotate your forearm toward your body until it touches your belly. Slowly move it back to where you started. 5. Keep your elbow and upper arm firmly tucked against the towel roll or at your side. 6. Repeat 8 to 12 times. External rotator strengthening exercise 1. Start by tying a piece of elastic exercise material to a doorknob. You can use surgical tubing or Thera-Band. (You may also hold one end of the band in each hand.) 2. Stand or sit with your shoulder relaxed and your elbow bent 90 degrees. Your upper arm should rest comfortably against your side. Squeeze a rolled towel between your elbow and your body for comfort. This will help keep your arm at your side. 3. Hold one end of the elastic band with the hand of the painful arm. 4. Start with your forearm across your belly. Slowly rotate the forearm out away from your body. Keep your elbow and upper arm tucked against the towel roll or the side of your body until you begin to feel tightness in your shoulder. Slowly move your arm back to where you started. 5. Repeat 8 to 12 times. Follow-up care is a key part of your treatment and safety. Be sure to make and go to all appointments, and call your doctor if you are having problems. It's also a good idea to know your test results and keep a list of the medicines you take. Where can you learn more? Go to http://blanca-zbigniew.info/. Enter V250 in the search box to learn more about \"Shoulder Bursitis: Exercises. \" Current as of: March 21, 2017 Content Version: 11.4 © 4048-4887 [employer]. Care instructions adapted under license by [employer] (which disclaims liability or warranty for this information). If you have questions about a medical condition or this instruction, always ask your healthcare professional. [place] [address] any warranty or liability for your use of this information. Joint Injections: Care Instructions Your Care Instructions Joint injections are shots into a joint, such as the knee. They may be used to put in medicines, such as pain relievers. Or they can be used to take out fluid. Sometimes the fluid is tested in a lab. This can help find the cause of a joint problem. A corticosteroid, or steroid, shot is used to reduce inflammation in tendons or joints. It is often used to treat problems such as arthritis, tendinitis, and bursitis. Steroids can be injected directly into a painful, inflamed joint. They can also help reduce inflammation of a bursa. A bursa is a sac of fluid. It cushions and lubricates areas where tendons, ligaments, skin, muscles, or bones rub against each other. A steroid shot can sometimes help with short-term pain relief when other treatments haven't worked. If steroid shots help, pain may improve for weeks or months. Follow-up care is a key part of your treatment and safety. Be sure to make and go to all appointments, and call your doctor if you are having problems. It's also a good idea to know your test results and keep a list of the medicines you take. How can you care for yourself at home? · Put ice or a cold pack on the area for 10 to 20 minutes at a time. Put a thin cloth between the ice and your skin. · Take anti-inflammatory medicines to reduce pain, swelling, or inflammation. These include ibuprofen (Advil, Motrin) and naproxen (Aleve). Read and follow all instructions on the label. · Avoid strenuous activities for several days, especially those that put stress on the area where you got the shot. · If you have dressings over the area, keep them clean and dry. You may remove them when your doctor tells you to. When should you call for help? Call your doctor now or seek immediate medical care if: 
? · You have signs of infection, such as: 
¨ Increased pain, swelling, warmth, or redness. ¨ Red streaks leading from the site. ¨ Pus draining from the site. ¨ A fever. ? Watch closely for changes in your health, and be sure to contact your doctor if you have any problems. Where can you learn more? Go to http://blanca-zbigniew.info/. Enter N616 in the search box to learn more about \"Joint Injections: Care Instructions. \" 
 Current as of: March 21, 2017 Content Version: 11.4 © 6708-7338 [employer]. Care instructions adapted under license by [employer] (which disclaims liability or warranty for this information). If you have questions about a medical condition or this instruction, always ask your healthcare professional. [address] any warranty or liability for your use of this information. Introducing [facility]! Carmelita Fothergill introduces [employer] patient portal. Now you can access parts of your medical record, email your doctor's office, and request medication refills online. 1. In your internet browser, go to [email or web address] 2. Click on the First Time User? Click Here link in the Sign In box. You will see the New Member Sign Up page. 3. Enter your Transmension Access Code exactly as it appears below. You will not need to use this code after youve completed the sign-up process. If you do not sign up before the expiration date, you must request a new code. · Trans[employer] Access Code: [email or web address] Expires: 6/3/2018  3:47 PM 
 
4. Enter the last four digits of your Social Security Number (xxxx) and Date of Birth (mm/dd/yyyy) as indicated and click Submit. You will be taken to the next sign-up page. 5. Create a Transmension ID. This will be your Transmension login ID and cannot be changed, so think of one that is secure and easy to remember. 6. Create a [employer]ension password. You can change your password at any time. 7. Enter your Password Reset Question and Answer. This can be used at a later time if you forget your password. 8. Enter your e-mail address. You will receive e-mail notification when new information is available in [address] 9. Click Sign Up. You can now view and download portions of your medical record. 10. Click the Download Summary menu link to download a portable copy of your medical information. If you have questions, please visit the Frequently Asked Questions section of the [employer] website. Remember, [employer] is NOT to be used for urgent needs. For medical emergencies, dial 911. Now available from your iPhone and Android! Please provide this summary of care documentation to your next provider. Your primary care clinician is listed as NONE. If you have any questions after today's visit, please call [phone].

## 2018-03-26 ENCOUNTER — OFFICE VISIT (OUTPATIENT)
Dept: ORTHOPEDIC SURGERY | Facility: CLINIC | Age: 68
End: 2018-03-26

## 2018-03-26 VITALS
OXYGEN SATURATION: 95 % | HEART RATE: 57 BPM | WEIGHT: 124 LBS | TEMPERATURE: 97.7 F | BODY MASS INDEX: 24.35 KG/M2 | HEIGHT: 60 IN | DIASTOLIC BLOOD PRESSURE: 83 MMHG | RESPIRATION RATE: 12 BRPM | SYSTOLIC BLOOD PRESSURE: 131 MMHG

## 2018-03-26 DIAGNOSIS — Z98.1 S/P LUMBAR FUSION: ICD-10-CM

## 2018-03-26 DIAGNOSIS — R20.2 NUMBNESS AND TINGLING IN BOTH HANDS: ICD-10-CM

## 2018-03-26 DIAGNOSIS — M75.21 BICEPS TENDONITIS OF BOTH SHOULDERS: ICD-10-CM

## 2018-03-26 DIAGNOSIS — G89.29 CHRONIC LEFT SHOULDER PAIN: ICD-10-CM

## 2018-03-26 DIAGNOSIS — M75.52 CHRONIC SHOULDER BURSITIS, LEFT: ICD-10-CM

## 2018-03-26 DIAGNOSIS — M75.51 CHRONIC SHOULDER BURSITIS, RIGHT: ICD-10-CM

## 2018-03-26 DIAGNOSIS — M19.011 PRIMARY OSTEOARTHRITIS OF RIGHT SHOULDER: ICD-10-CM

## 2018-03-26 DIAGNOSIS — G89.29 CHRONIC RIGHT SHOULDER PAIN: ICD-10-CM

## 2018-03-26 DIAGNOSIS — R20.0 NUMBNESS AND TINGLING IN BOTH HANDS: ICD-10-CM

## 2018-03-26 DIAGNOSIS — M75.22 BICEPS TENDONITIS OF BOTH SHOULDERS: ICD-10-CM

## 2018-03-26 DIAGNOSIS — M25.512 CHRONIC LEFT SHOULDER PAIN: ICD-10-CM

## 2018-03-26 DIAGNOSIS — M25.511 CHRONIC RIGHT SHOULDER PAIN: ICD-10-CM

## 2018-03-26 DIAGNOSIS — M19.012 PRIMARY OSTEOARTHRITIS OF LEFT SHOULDER: Primary | ICD-10-CM

## 2018-03-26 NOTE — PROGRESS NOTES
Patient: Mayra Schaeffer                MRN: [id number]       SSN: [id number]  YOB: 1950        AGE: 77 y.o. SEX: male    PCP: None  03/26/18    No chief complaint on file. HISTORY:  Mayra Schaeffer is a 77 y.o. male who is seen for bilateral shoulder R>L and hand pain. He reports shoulder pain for some time with no history of injury. He notes numbness and tingling in both hands. He reports a h/o carpal tunnel syndrome. He states he has pain in his wrist and hands. He notes pain radiating up to his right elbow. He states his right shoulder pain radiates into his pectoralis area. He reports increased pain at night and difficulty sleeping on his side. He is s/p lumbar fusion 18 years ago by Dr. Eliza Infante. He reports relief from a previous right shoulder cortisone injection in [place]. 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 He stopped working after his lumbar fusion 18 years ago. He is originally from [place] and also lived in [place] for many years. She moved to this area 2 years ago to be closer to his ill sister. He lives in [place] with his wife. He has four children- 1 daughter and 3 sons. He has one son that lives in the area. He has 15 grandchildren and 10 great-grandchildren. Current weight is 122 pounds. He is 4'9\" tall. He is hypertensive. He is not diabetic. He is right handed.      No results found for: HBA1C, HGBE8, JZK1HGNY, UMZ3OTQV, UNI9FDHV  Weight Metrics 3/16/2018 3/14/2018 3/9/2018 3/5/2018   Weight 122 lb - 118 lb 129 lb   BMI 26.4 kg/m2 25.53 kg/m2 - 27.92 kg/m2       Patient Active Problem List   Diagnosis Code   (none) - all problems resolved or deleted     REVIEW OF SYSTEMS: All Below are Negative except: See HPI   Constitutional: negative for fever, chills, and weight loss. Cardiovascular: negative for chest pain, claudication, leg swelling, SOB, JAIN   Gastrointestinal: Negative for pain, N/V/C/D, Blood in stool or urine, dysuria,  hematuria, incontinence, pelvic pain. Musculoskeletal: See HPI   Neurological: Negative for dizziness and weakness. Negative for headaches, Visual changes, confusion, seizures   Phychiatric/Behavioral: Negative for depression, memory loss, substance  abuse. Extremities: Negative for hair changes, rash, or skin lesion changes. Hematologic: Negative for bleeding problems, bruising, pallor or swollen lymph  nodes   Peripheral Vascular: No calf pain, no circulation deficits. Social History     Social History    Marital status: UNKNOWN     Spouse name: N/A    Number of children: N/A    Years of education: N/A     Occupational History    Not on file. Social History Main Topics    Smoking status: Current Every Day Smoker     Packs/day: 0.50     Years: 40.00    Smokeless tobacco: Never Used    Alcohol use No    Drug use: No    Sexual activity: No     Other Topics Concern    Not on file     Social History Narrative      Allergies   Allergen Reactions    Keflex [Cephalexin] Hives    Morphine Hives      Current Outpatient Prescriptions   Medication Sig    METOPROLOL SUCCINATE PO Take  by mouth.  LISINOPRIL PO Take  by mouth.  ROSUVASTATIN CALCIUM (CRESTOR PO) Take  by mouth.  OMEPRAZOLE PO Take  by mouth.  TAMSULOSIN HCL (TAMSULOSIN PO) Take  by mouth.  TIOTROPIUM BR/OLODATEROL HCL (STIOLTO RESPIMAT IN) Take  by inhalation.  albuterol sulfate (PROVENTIL;VENTOLIN) 2.5 mg/0.5 mL nebu nebulizer solution by Nebulization route once.  clopidogrel (PLAVIX) 75 mg tab Take  by mouth. No current facility-administered medications for this visit.        PHYSICAL EXAMINATION:  There were no vitals taken for this visit. ORTHO EXAMINATION:  Examination Right shoulder Left shoulder   Skin Intact Intact   Effusion - -   Biceps deformity - -   Atrophy - -   AC joint tenderness - -   Acromial tenderness +, anterolateral +, anterolateral   Biceps tenderness - -   Forward flexion/Elevation  165   Active abduction  165   External rotation ROM 15 15   Internal rotation ROM 85 85   Apprehension - -   Impingement - -   Drop Arm Test - -   Neurovascular Intact Intact   Painful arc of motion beyond 90  Examination Right Hand Left Hand   Skin Intact Intact   Deformity - -   Swelling - -   Tenderness - -   Finger flexion Full Full   Finger extension Full Full   Sensation Normal Normal   Capillary refill Normal Normal   Heberden's nodes + +   Dupuytren's - -     PROCEDURE:  After discussing treatment options, patient's shoulders were injected with 4 cc Marcaine and 1/2 cc Celestone. Chart reviewed for the following:   Rosa Payne MD, have reviewed the History, Physical and updated the Allergic reactions for [place] [address] performed immediately prior to start of procedure:  Rosa Payne MD, have performed the following reviews on Diann Young prior to the start of the procedure:            * Patient was identified by name and date of birth   * Agreement on procedure being performed was verified  * Risks and Benefits explained to the patient  * Procedure site verified and marked as necessary  * Patient was positioned for comfort  * Consent was obtained     Time: 2:48 PM     Date of procedure: 3/26/2018    Procedure performed by:  Katerina Vaca MD    Mr. Lowell Carr tolerated the procedure well with no complications. RADIOGRAPHS:  XR BILATERAL SHOULDER 3/16/18  IMPRESSION:  Three views - No fractures, no acromioclavicular narrowing, mild glenohumeral narrowing, no calcific densities. Small inferior glenohumeral and humeral head osteophytes. IMPRESSION:    No diagnosis found. PLAN:  After discussing treatment options, patient's shoulders were injected with 4 cc Marcaine and 1/2 cc Celestone. He will follow up in 5 weeks with the results of her RUE EMG/NCV study. He will start a brief course of outpatient physical therapy to his shoulders.       Scribed by Brittnee Hull ([address]) as dictated by Juan Michel MD

## 2018-04-09 ENCOUNTER — TELEPHONE (OUTPATIENT)
Dept: FAMILY MEDICINE CLINIC | Age: 68
End: 2018-04-09

## 2018-04-09 ENCOUNTER — OFFICE VISIT (OUTPATIENT)
Dept: FAMILY MEDICINE CLINIC | Age: 68
End: 2018-04-09

## 2018-04-09 DIAGNOSIS — G89.29 CHRONIC RIGHT SHOULDER PAIN: Primary | ICD-10-CM

## 2018-04-09 DIAGNOSIS — M25.511 CHRONIC RIGHT SHOULDER PAIN: Primary | ICD-10-CM

## 2018-04-09 RX ORDER — HYDROCODONE BITARTRATE AND ACETAMINOPHEN 7.5; 325 MG/1; MG/1
1 TABLET ORAL
Qty: 4 TAB | Refills: 0 | Status: SHIPPED | OUTPATIENT
Start: 2018-04-09 | End: 2018-04-19 | Stop reason: ALTCHOICE

## 2018-04-09 NOTE — TELEPHONE ENCOUNTER
Pt saw ortho, had injection, still hurting, asking for pain med.   Will give 4 tablets of norco and refer back to ortho (he can take norco)

## 2018-04-19 ENCOUNTER — OFFICE VISIT (OUTPATIENT)
Dept: FAMILY MEDICINE CLINIC | Age: 68
End: 2018-04-19

## 2018-04-19 VITALS
SYSTOLIC BLOOD PRESSURE: 135 MMHG | RESPIRATION RATE: 18 BRPM | OXYGEN SATURATION: 94 % | HEART RATE: 59 BPM | HEIGHT: 60 IN | BODY MASS INDEX: 24.54 KG/M2 | TEMPERATURE: 96.3 F | WEIGHT: 125 LBS | DIASTOLIC BLOOD PRESSURE: 90 MMHG

## 2018-04-19 DIAGNOSIS — M25.512 CHRONIC PAIN OF BOTH SHOULDERS: Primary | ICD-10-CM

## 2018-04-19 DIAGNOSIS — M25.511 CHRONIC PAIN OF BOTH SHOULDERS: Primary | ICD-10-CM

## 2018-04-19 DIAGNOSIS — G89.29 CHRONIC PAIN OF BOTH SHOULDERS: Primary | ICD-10-CM

## 2018-04-19 RX ORDER — MELOXICAM 7.5 MG/1
7.5 TABLET ORAL DAILY
Qty: 30 TAB | Refills: 1 | Status: SHIPPED | OUTPATIENT
Start: 2018-04-19 | End: 2018-07-25

## 2018-04-19 RX ORDER — TRAMADOL HYDROCHLORIDE 50 MG/1
50 TABLET ORAL 2 TIMES DAILY
Qty: 28 TAB | Refills: 0 | Status: SHIPPED | OUTPATIENT
Start: 2018-04-19 | End: 2018-05-03

## 2018-04-19 NOTE — PROGRESS NOTES
HISTORY OF PRESENT ILLNESS  Michaela Hancock is a 77 y.o. male. HPI Comments: Pt presents with c/o of bilateral arm pain. He has a known history of this and was treated about a month ago with steroid injections in both shoulders. States he also has fibromyalgia. States he has taken Aleve in the past, and got an ulcer from taking too many. States he did not take the four pain pills Dr Dago Medina gave him last time because he doesn't like pain pills, except tramadol (50 mg), which helps. Hand Pain      Arm Pain          Review of Systems   Musculoskeletal: Positive for joint pain. Visit Vitals    /90 (BP 1 Location: Left arm, BP Patient Position: Sitting)    Pulse (!) 59    Temp 96.3 °F (35.7 °C) (Oral)    Resp 18    Ht 4' 9\" (1.448 m)    Wt 125 lb (56.7 kg)    SpO2 94%    BMI 27.05 kg/m2       Physical Exam   Constitutional: He is oriented to person, place, and time. He appears well-developed and well-nourished. He is cooperative. No distress. HENT:   Head: Normocephalic and atraumatic. Right Ear: External ear normal.   Left Ear: External ear normal.   Nose: Nose normal. No nasal deformity. Mouth/Throat: Abnormal dentition. Eyes: Conjunctivae are normal.   Neck: Neck supple. Cardiovascular:   Mild bradycardia, typical for pt (on metoprolol)   Pulmonary/Chest: Effort normal. No respiratory distress. Musculoskeletal:        Right shoulder: He exhibits decreased range of motion (secondary to pain) and pain. He exhibits no swelling and no crepitus. Left shoulder: He exhibits pain. He exhibits no swelling and no crepitus. Right elbow: He exhibits no swelling and no deformity. Left elbow: He exhibits no swelling and no deformity. Right wrist: He exhibits no swelling, no crepitus and no deformity. Left wrist: He exhibits no swelling, no crepitus and no deformity. Neurological: He is alert and oriented to person, place, and time. Skin: Skin is warm and dry.  He is not diaphoretic. Psychiatric: He has a normal mood and affect. His speech is normal and behavior is normal. Thought content normal.   Nursing note and vitals reviewed. ASSESSMENT and PLAN    ICD-10-CM ICD-9-CM    1. Chronic pain of both shoulders M25.511 719.41 meloxicam (MOBIC) 7.5 mg tablet    G89.29 338.29 traMADol (ULTRAM) 50 mg tablet    M25.512       Pt instructed to return to ortho for follow-up and further management of his pain. One-time prescription of Tramadol proved to get him by until ortho follow-up. Starting on Meloxicam in hopes of improving pain and function. Pt cautioned regarding increased bleeding risk (states he has taken the two together previously with no problems): advised to stop Mobic and seek medical attention for any bleeding that won't stop, coffee-ground emesis, bloody/black/tarry stools.  reviewed. No aberrant behavior noted. Pt verbalized understanding and agreement with the plan of care.     Bonnie Brown PA-C

## 2018-04-19 NOTE — PROGRESS NOTES
Rm: 8    Chief Complaint   Patient presents with    Hand Pain     bilateral    Arm Pain     bilateral     Depression Screening:  PHQ over the last two weeks 4/19/2018 3/16/2018 3/5/2018   Little interest or pleasure in doing things Not at all Not at all Not at all   Feeling down, depressed or hopeless Not at all Not at all Not at all   Total Score PHQ 2 0 0 0       Learning Assessment:  Learning Assessment 3/5/2018   PRIMARY LEARNER Patient   HIGHEST LEVEL OF EDUCATION - PRIMARY LEARNER  DID NOT GRADUATE HIGH SCHOOL   PRIMARY LANGUAGE ENGLISH   LEARNER PREFERENCE PRIMARY READING   ANSWERED BY patient   RELATIONSHIP SELF       Abuse Screening:  No flowsheet data found. Health Maintenance reviewed and discussed per provider: yes     Coordination of Care:    1. Have you been to the ER, urgent care clinic since your last visit? Hospitalized since your last visit? no    2. Have you seen or consulted any other health care providers outside of the [facility] since your last visit? Include any pap smears or colon screening.  no

## 2018-04-19 NOTE — PATIENT INSTRUCTIONS
Follow-up with orthopedics. They should be managing your pain. The Tramadol prescription should be considered a one-time prescription. I recommend giving Dr Nicole Morales office a call in advance to make sure you've gotten all the tests he wanted you to get. Take the meloxicam once daily for 3-5 days. If your pain and function do not improve, you may take two tablets per day. Avoid taking meloxicam with any other NSAIDs (ibuprofen, Aleve, aspirin, etc.). Musculoskeletal Pain: Care Instructions  Your Care Instructions    Different problems with the bones, muscles, nerves, ligaments, and tendons in the body can cause pain. One or more areas of your body may ache or burn. Or they may feel tired, stiff, or sore. The medical term for this type of pain is musculoskeletal pain. It can have many different causes. Sometimes the pain is caused by an injury such as a strain or sprain. Or you might have pain from using one part of your body in the same way over and over again. This is called overuse. In some cases, the cause of the pain is another health problem such as arthritis or fibromyalgia. The doctor will examine you and ask you questions about your health to help find the cause of your pain. Blood tests or imaging tests like an X-ray may also be helpful. But sometimes doctors can't find a cause of the pain. Treatment depends on your symptoms and the cause of the pain, if known. The doctor has checked you carefully, but problems can develop later. If you notice any problems or new symptoms, get medical treatment right away. Follow-up care is a key part of your treatment and safety. Be sure to make and go to all appointments, and call your doctor if you are having problems. It's also a good idea to know your test results and keep a list of the medicines you take. How can you care for yourself at home? · Rest until you feel better. · Do not do anything that makes the pain worse.  Return to exercise gradually if you feel better and your doctor says it's okay. · Be safe with medicines. Read and follow all instructions on the label. ¨ If the doctor gave you a prescription medicine for pain, take it as prescribed. ¨ If you are not taking a prescription pain medicine, ask your doctor if you can take an over-the-counter medicine. · Put ice or a cold pack on the area for 10 to 20 minutes at a time to ease pain. Put a thin cloth between the ice and your skin. When should you call for help? Call your doctor now or seek immediate medical care if:  ? · You have new pain, or your pain gets worse. ? · You have new symptoms such as a fever, a rash, or chills. ? Watch closely for changes in your health, and be sure to contact your doctor if:  ? · You do not get better as expected. Where can you learn more? Go to http://blancaAlereonzbigniew.info/. Enter K278 in the search box to learn more about \"Musculoskeletal Pain: Care Instructions. \"  Current as of: October 14, 2016  Content Version: 11.4  © 4514-5785 [employer]. Care instructions adapted under license by [employer] (which disclaims liability or warranty for this information). If you have questions about a medical condition or this instruction, always ask your healthcare professional. [address] any warranty or liability for your use of this information. Tramadol (By mouth)   Tramadol (TRAM-a-dol)  Treats moderate to severe pain. This medicine is a narcotic pain reliever. Brand Name(s): ConZip, FusePaq Synapryn, Ultram, Ultram ER, traMADol HCl   There may be other brand names for this medicine. When This Medicine Should Not Be Used: This medicine is not right for everyone. Do not use if you had an allergic reaction to tramadol or other narcotic medicine, or if you have stomach or bowel blockage (including paralytic ileus).   How to Use This Medicine:   Long Acting Capsule, Liquid, Tablet, Dissolving Tablet, Long Acting Tablet  · Take your medicine as directed. Your dose may need to be changed several times to find what works best for you. · Make sure your hands are dry before you handle the disintegrating tablet. Peel back the foil from the blister pack, then remove the tablet. Do not push the tablet through the foil. Place the tablet in your mouth. After it has melted, swallow or take a drink of water. · Swallow the extended-release tablet whole. Do not crush, break, or chew it. · Drink plenty of liquids to help avoid constipation. · This medicine should come with a Medication Guide. Ask your pharmacist for a copy if you do not have one. · Missed dose: Take a dose as soon as you remember. If it is almost time for your next dose, wait until then and take a regular dose. Do not take extra medicine to make up for a missed dose. · Store the medicine in a closed container at room temperature, away from heat, moisture, and direct light. Drugs and Foods to Avoid:   Ask your doctor or pharmacist before using any other medicine, including over-the-counter medicines, vitamins, and herbal products. · Do not use this medicine if you are using or have used an MAO inhibitor within the past 14 days. · Some medicines can affect how tramadol works. Tell your doctor if you are using any of the following:  ¨ Carbamazepine, cyclobenzaprine, digoxin, erythromycin, ketoconazole, lithium, mirtazapine, phenytoin, promethazine, rifampin, ritonavir, quinidine, or trazodone  ¨ Blood thinner (including warfarin)  ¨ Diuretic (water pill)  ¨ Medicine to treat depression (including bupropion, fluoxetine, paroxetine, quinidine)  ¨ Phenothiazine medicine  ¨ Triptan medicine for migraine headaches  · Tell your doctor if you use anything else that makes you sleepy. Some examples are allergy medicine, narcotic pain medicine, and alcohol. Tell your doctor if you are using a muscle relaxer.   · Do not drink alcohol while you are using this medicine. Warnings While Using This Medicine:   · Tell your doctor if you are pregnant or breastfeeding, or if you have kidney disease, liver disease (including cirrhosis), gallstones, lung or breathing problems, pancreas problems, or a history of head injury, seizures, drug addiction, or depression or similar emotional problems. Tell your doctor if you have phenylketonuria. · This medicine may cause the following problems:  ¨ High risk of overdose, which can lead to death  ¨ Respiratory depression (serious breathing problem that can be life-threatening)  ¨ Serotonin syndrome (when used with certain medicines)  ¨ Unusual change in mood or behavior  · This medicine may make you dizzy, drowsy, or lightheaded. Do not drive or doing anything else that could be dangerous until you know how this medicine affects you. · This medicine can be habit-forming. Do not use more than your prescribed dose. Call your doctor if you think your medicine is not working. · Do not stop using this medicine suddenly. Your doctor will need to slowly decrease your dose before you stop it completely. · Tell any doctor or dentist who treats you that you are using this medicine. · This medicine may cause constipation, especially with long-term use. Ask your doctor if you should use a laxative to prevent and treat constipation. · This medicine could cause infertility. Talk with your doctor before using this medicine if you plan to have children. · Keep all medicine out of the reach of children. Never share your medicine with anyone.   Possible Side Effects While Using This Medicine:   Call your doctor right away if you notice any of these side effects:  · Allergic reaction: Itching or hives, swelling in your face or hands, swelling or tingling in your mouth or throat, chest tightness, trouble breathing  · Anxiety, restlessness, fast heartbeat, fever, sweating, muscle spasms, nausea, vomiting, diarrhea, seeing or hearing things that are not there  · Blistering, peeling, red skin rash  · Blue lips, fingernails, or skin  · Extreme dizziness, drowsiness, or weakness, shallow breathing, slow heartbeat, seizures, and cold, clammy skin  · Lightheadedness, dizziness, fainting  · Seizures  · Unusual mood or behavior, thoughts of killing yourself or others  · Trouble breathing  If you notice these less serious side effects, talk with your doctor:   · Constipation, loss of appetite, stomach upset  · Dry mouth  · Headache  If you notice other side effects that you think are caused by this medicine, tell your doctor. Call your doctor for medical advice about side effects. You may report side effects to FDA at 2-218-NXG-3834  © 2017 [facility] Information is for End User's use only and may not be sold, redistributed or otherwise used for commercial purposes. The above information is an  only. It is not intended as medical advice for individual conditions or treatments. Talk to your doctor, nurse or pharmacist before following any medical regimen to see if it is safe and effective for you.

## 2018-04-19 NOTE — MR AVS SNAPSHOT
Eboni Knight De Jesus [address] [facility] [address] [address] 
[phone] Patient: Chantal Martinez MRN: [id number] BUJ:[id number] Visit Information Date & Time Provider Department Dept. Phone Encounter #  
 4/19/2018  3:45 PM Beverly Guerrero PA-C 2sms [phone] [id number] Follow-up Instructions Return if symptoms worsen or fail to improve. Upcoming Health Maintenance Date Due Hepatitis C Screening 1950 DTaP/Tdap/Td series (1 - Tdap) 2/20/1969 FOBT Q 1 YEAR AGE 50-75 2/20/1998 ZOSTER VACCINE AGE 60> 12/20/2007 GLAUCOMA SCREENING Q2Y 2/20/2013 Pneumococcal 65+ Low/Medium Risk (1 of 2 - PCV13) 2/20/2013 Influenza Age 5 to Adult 8/1/2017 MEDICARE YEARLY EXAM 3/20/2018 Allergies as of 4/19/2018  Review Complete On: 4/19/2018 By: Beverly Guerrero PA-C Severity Noted Reaction Type Reactions Keflex [Cephalexin]  03/05/2018    Hives Morphine  03/05/2018    Hives Current Immunizations  Never Reviewed No immunizations on file. Not reviewed this visit You Were Diagnosed With   
  
 Codes Comments Chronic pain of both shoulders    -  Primary ICD-10-CM: M25.511, G89.29, M25.512 ICD-9-CM: 719.41, 338.29 Vitals BP Pulse Temp Resp Height(growth percentile) Weight(growth percentile) 135/90 (BP 1 Location: Left arm, BP Patient Position: Sitting) (!) 59 96.3 °F (35.7 °C) (Oral) 18 4' 9\" (1.448 m) 125 lb (56.7 kg) SpO2 BMI Smoking Status 94% 27.05 kg/m2 Current Every Day Smoker Vitals History BMI and BSA Data Body Mass Index Body Surface Area  
 27.05 kg/m 2 1.51 m 2 Preferred Pharmacy Pharmacy Name Phone [facility] [address] [place] [address] Arlene Mckenzie Your Updated Medication List  
  
   
This list is accurate as of 4/19/18  4:26 PM.  Always use your most recent med list.  
  
  
  
  
 albuterol sulfate 2.5 mg/0.5 mL Nebu nebulizer solution Commonly known as:  PROVENTIL;VENTOLIN  
by Nebulization route once. CRESTOR PO Take  by mouth. LISINOPRIL PO Take  by mouth.  
  
 meloxicam 7.5 mg tablet Commonly known as:  MOBIC Take 1 Tab by mouth daily. METOPROLOL SUCCINATE PO Take  by mouth. OMEPRAZOLE PO Take  by mouth. PLAVIX 75 mg Tab Generic drug:  clopidogrel Take  by mouth. STIOLTO RESPIMAT IN Take  by inhalation. TAMSULOSIN PO Take  by mouth. traMADol 50 mg tablet Commonly known as:  ULTRAM  
Take 1 Tab by mouth two (2) times a day for 14 days. Max Daily Amount: 100 mg. Indications: Pain Prescriptions Printed Refills  
 traMADol (ULTRAM) 50 mg tablet 0 Sig: Take 1 Tab by mouth two (2) times a day for 14 days. Max Daily Amount: 100 mg. Indications: Pain Class: Print Route: Oral  
  
Prescriptions Sent to Pharmacy Refills  
 meloxicam (MOBIC) 7.5 mg tablet 1 Sig: Take 1 Tab by mouth daily. Class: Normal  
 Pharmacy: [facility] [address] [place] [address] #: [phone] Route: Oral  
  
Follow-up Instructions Return if symptoms worsen or fail to improve. Patient Instructions Follow-up with orthopedics. They should be managing your pain. The Tramadol prescription should be considered a one-time prescription. I recommend giving Dr Nereida Santacruz office a call in advance to make sure you've gotten all the tests he wanted you to get. Take the meloxicam once daily for 3-5 days. If your pain and function do not improve, you may take two tablets per day. Avoid taking meloxicam with any other NSAIDs (ibuprofen, Aleve, aspirin, etc.). Musculoskeletal Pain: Care Instructions Your Care Instructions Different problems with the bones, muscles, nerves, ligaments, and tendons in the body can cause pain. One or more areas of your body may ache or burn. Or they may feel tired, stiff, or sore. The medical term for this type of pain is musculoskeletal pain. It can have many different causes. Sometimes the pain is caused by an injury such as a strain or sprain. Or you might have pain from using one part of your body in the same way over and over again. This is called overuse. In some cases, the cause of the pain is another health problem such as arthritis or fibromyalgia. The doctor will examine you and ask you questions about your health to help find the cause of your pain. Blood tests or imaging tests like an X-ray may also be helpful. But sometimes doctors can't find a cause of the pain. Treatment depends on your symptoms and the cause of the pain, if known. The doctor has checked you carefully, but problems can develop later. If you notice any problems or new symptoms, get medical treatment right away. Follow-up care is a key part of your treatment and safety. Be sure to make and go to all appointments, and call your doctor if you are having problems. It's also a good idea to know your test results and keep a list of the medicines you take. How can you care for yourself at home? · Rest until you feel better. · Do not do anything that makes the pain worse. Return to exercise gradually if you feel better and your doctor says it's okay. · Be safe with medicines. Read and follow all instructions on the label. ¨ If the doctor gave you a prescription medicine for pain, take it as prescribed. ¨ If you are not taking a prescription pain medicine, ask your doctor if you can take an over-the-counter medicine. · Put ice or a cold pack on the area for 10 to 20 minutes at a time to ease pain. Put a thin cloth between the ice and your skin. When should you call for help? Call your doctor now or seek immediate medical care if: 
? · You have new pain, or your pain gets worse. ? · You have new symptoms such as a fever, a rash, or chills. ? Watch closely for changes in your health, and be sure to contact your doctor if: 
? · You do not get better as expected. Where can you learn more? Go to http://blanca-zbigniew.info/. Enter B345 in the search box to learn more about \"Musculoskeletal Pain: Care Instructions. \" Current as of: October 14, 2016 Content Version: 11.4 © 2918-5833 [employer]. Care instructions adapted under license by [employer] (which disclaims liability or warranty for this information). If you have questions about a medical condition or this instruction, always ask your healthcare professional. [address] any warranty or liability for your use of this information. Tramadol (By mouth) Tramadol (TRAM-a-dol) Treats moderate to severe pain. This medicine is a narcotic pain reliever. Brand Name(s): ConZip, FusePaq Synapryn, Ultram, Ultram ER, traMADol HCl There may be other brand names for this medicine. When This Medicine Should Not Be Used: This medicine is not right for everyone. Do not use if you had an allergic reaction to tramadol or other narcotic medicine, or if you have stomach or bowel blockage (including paralytic ileus). How to Use This Medicine:  
Long Acting Capsule, Liquid, Tablet, Dissolving Tablet, Long Acting Tablet · Take your medicine as directed. Your dose may need to be changed several times to find what works best for you. · Make sure your hands are dry before you handle the disintegrating tablet. Peel back the foil from the blister pack, then remove the tablet. Do not push the tablet through the foil. Place the tablet in your mouth. After it has melted, swallow or take a drink of water. · Swallow the extended-release tablet whole. Do not crush, break, or chew it. · Drink plenty of liquids to help avoid constipation. · This medicine should come with a Medication Guide. Ask your pharmacist for a copy if you do not have one. · Missed dose: Take a dose as soon as you remember. If it is almost time for your next dose, wait until then and take a regular dose. Do not take extra medicine to make up for a missed dose. · Store the medicine in a closed container at room temperature, away from heat, moisture, and direct light. Drugs and Foods to Avoid: Ask your doctor or pharmacist before using any other medicine, including over-the-counter medicines, vitamins, and herbal products. · Do not use this medicine if you are using or have used an MAO inhibitor within the past 14 days. · Some medicines can affect how tramadol works. Tell your doctor if you are using any of the following: ¨ Carbamazepine, cyclobenzaprine, digoxin, erythromycin, ketoconazole, lithium, mirtazapine, phenytoin, promethazine, rifampin, ritonavir, quinidine, or trazodone ¨ Blood thinner (including warfarin) ¨ Diuretic (water pill) ¨ Medicine to treat depression (including bupropion, fluoxetine, paroxetine, quinidine) ¨ Phenothiazine medicine ¨ Triptan medicine for migraine headaches · Tell your doctor if you use anything else that makes you sleepy. Some examples are allergy medicine, narcotic pain medicine, and alcohol. Tell your doctor if you are using a muscle relaxer. · Do not drink alcohol while you are using this medicine. Warnings While Using This Medicine: · Tell your doctor if you are pregnant or breastfeeding, or if you have kidney disease, liver disease (including cirrhosis), gallstones, lung or breathing problems, pancreas problems, or a history of head injury, seizures, drug addiction, or depression or similar emotional problems. Tell your doctor if you have phenylketonuria. · This medicine may cause the following problems: 
¨ High risk of overdose, which can lead to death ¨ Respiratory depression (serious breathing problem that can be life-threatening) ¨ Serotonin syndrome (when used with certain medicines) ¨ Unusual change in mood or behavior · This medicine may make you dizzy, drowsy, or lightheaded. Do not drive or doing anything else that could be dangerous until you know how this medicine affects you. · This medicine can be habit-forming. Do not use more than your prescribed dose. Call your doctor if you think your medicine is not working. · Do not stop using this medicine suddenly. Your doctor will need to slowly decrease your dose before you stop it completely. · Tell any doctor or dentist who treats you that you are using this medicine. · This medicine may cause constipation, especially with long-term use. Ask your doctor if you should use a laxative to prevent and treat constipation. · This medicine could cause infertility. Talk with your doctor before using this medicine if you plan to have children. · Keep all medicine out of the reach of children. Never share your medicine with anyone. Possible Side Effects While Using This Medicine:  
Call your doctor right away if you notice any of these side effects: · Allergic reaction: Itching or hives, swelling in your face or hands, swelling or tingling in your mouth or throat, chest tightness, trouble breathing · Anxiety, restlessness, fast heartbeat, fever, sweating, muscle spasms, nausea, vomiting, diarrhea, seeing or hearing things that are not there · Blistering, peeling, red skin rash · Blue lips, fingernails, or skin · Extreme dizziness, drowsiness, or weakness, shallow breathing, slow heartbeat, seizures, and cold, clammy skin · Lightheadedness, dizziness, fainting · Seizures · Unusual mood or behavior, thoughts of killing yourself or others · Trouble breathing If you notice these less serious side effects, talk with your doctor: · Constipation, loss of appetite, stomach upset · Dry mouth 
· Headache If you notice other side effects that you think are caused by this medicine, tell your doctor. Call your doctor for medical advice about side effects. You may report side effects to FDA at 1-516-ETD-3866 © 2017 Marcelle Lewis Information is for End User's use only and may not be sold, redistributed or otherwise used for commercial purposes. The above information is an  only. It is not intended as medical advice for individual conditions or treatments. Talk to your doctor, nurse or pharmacist before following any medical regimen to see if it is safe and effective for you. Introducing [facility] & HEALTH SERVICES! Chelsey Diallo introduces [employer] patient portal. Now you can access parts of your medical record, email your doctor's office, and request medication refills online. 1. In your internet browser, go to [email or web address] 2. Click on the First Time User? Click Here link in the Sign In box. You will see the New Member Sign Up page. 3. Enter your Dat[employer] Access Code exactly as it appears below. You will not need to use this code after youve completed the sign-up process. If you do not sign up before the expiration date, you must request a new code. · Dat[employer] Access Code: [email or web address] Expires: 6/3/2018  3:47 PM 
 
4. Enter the last four digits of your Social Security Number (xxxx) and Date of Birth (mm/dd/yyyy) as indicated and click Submit. You will be taken to the next sign-up page. 5. Create a Databox ID. This will be your Datab[employer] login ID and cannot be changed, so think of one that is secure and easy to remember. 6. Create a Dat[employer] password. You can change your password at any time. 7. Enter your Password Reset Question and Answer. This can be used at a later time if you forget your password. 8. Enter your e-mail address. You will receive e-mail notification when new information is available in [address] 9. Click Sign Up. You can now view and download portions of your medical record. 10. Click the Download Summary menu link to download a portable copy of your medical information. If you have questions, please visit the Frequently Asked Questions section of the [employer] website. Remember, [employer] is NOT to be used for urgent needs. For medical emergencies, dial 911. Now available from your iPhone and Android! Please provide this summary of care documentation to your next provider. Your primary care clinician is listed as NONE. If you have any questions after today's visit, please call [phone].

## 2018-05-09 ENCOUNTER — OFFICE VISIT (OUTPATIENT)
Dept: FAMILY MEDICINE CLINIC | Age: 68
End: 2018-05-09

## 2018-05-09 VITALS
RESPIRATION RATE: 16 BRPM | TEMPERATURE: 98 F | HEART RATE: 68 BPM | SYSTOLIC BLOOD PRESSURE: 141 MMHG | HEIGHT: 60 IN | WEIGHT: 127 LBS | OXYGEN SATURATION: 99 % | BODY MASS INDEX: 24.94 KG/M2 | DIASTOLIC BLOOD PRESSURE: 84 MMHG

## 2018-05-09 DIAGNOSIS — F17.200 SMOKER: ICD-10-CM

## 2018-05-09 DIAGNOSIS — H10.13 ALLERGIC CONJUNCTIVITIS OF BOTH EYES: Primary | ICD-10-CM

## 2018-05-09 DIAGNOSIS — J30.1 SEASONAL ALLERGIC RHINITIS DUE TO POLLEN: ICD-10-CM

## 2018-05-09 DIAGNOSIS — J40 BRONCHITIS: ICD-10-CM

## 2018-05-09 DIAGNOSIS — R05.9 COUGH: ICD-10-CM

## 2018-05-09 RX ORDER — AZITHROMYCIN 500 MG/1
500 TABLET, FILM COATED ORAL DAILY
Qty: 3 TAB | Refills: 0 | Status: SHIPPED | OUTPATIENT
Start: 2018-05-09 | End: 2018-05-12

## 2018-05-09 RX ORDER — CODEINE PHOSPHATE AND GUAIFENESIN 10; 100 MG/5ML; MG/5ML
10 SOLUTION ORAL
Qty: 120 ML | Refills: 0 | Status: SHIPPED | OUTPATIENT
Start: 2018-05-09 | End: 2018-06-13

## 2018-05-09 RX ORDER — AZELASTINE HCL 205.5 UG/1
2 SPRAY NASAL
Qty: 1 BOTTLE | Refills: 4 | Status: SHIPPED | OUTPATIENT
Start: 2018-05-09 | End: 2018-07-25 | Stop reason: SDUPTHER

## 2018-05-09 RX ORDER — AZELASTINE HYDROCHLORIDE 0.5 MG/ML
1 SOLUTION/ DROPS OPHTHALMIC
Qty: 6 ML | Refills: 5 | Status: SHIPPED | OUTPATIENT
Start: 2018-05-09 | End: 2018-07-25 | Stop reason: SDUPTHER

## 2018-05-09 NOTE — PROGRESS NOTES
HISTORY OF PRESENT ILLNESS  Elías Silva is a 77 y.o. male. HPI Comments: Mr. Jeremy Leonard has a PMH of allergic rhinitis, tobacco use, and abdominal hernias. He presents today with c/o 3 weeks of itchy, watery, burning eyes, headache, productive cough of green sputum, maxillary sinus congestion and pressure, and nasal congestion. He has right sided rib pain and is not sleeping well due to his coughing. He is also worried that his excessive coughing is going to complicate his multiple abdominal hernias. He has been taking Cetirizine and using Flonase with no relief. He has not had a fever or night sweats. He smokes but is working on quitting, down to 1/2 ppd now. Incidentally, he is flying to [facility] next week to get some kind of injection for his arthritis. He's known many people that went to this [place]ala that have been pain-free for over 1 year    Allergies   Associated symptoms include headaches. Pertinent negatives include no chest pain. Cough   Associated symptoms include headaches. Pertinent negatives include no chest pain. Review of Systems   Constitutional: Positive for malaise/fatigue. Negative for chills and fever. HENT: Positive for congestion. Negative for ear pain, nosebleeds, sore throat and tinnitus. Eyes: Negative for blurred vision and discharge. Respiratory: Positive for cough and sputum production. Negative for hemoptysis and wheezing. Cardiovascular: Negative for chest pain and palpitations. Gastrointestinal: Negative for heartburn. Genitourinary: Negative for dysuria and urgency. Neurological: Positive for headaches. Physical Exam   Constitutional: He appears well-developed and well-nourished.    HENT:   Right Ear: Tympanic membrane, external ear and ear canal normal.   Left Ear: Tympanic membrane, external ear and ear canal normal.   Nose: Nose normal.   Mouth/Throat: Uvula is midline, oropharynx is clear and moist and mucous membranes are normal. No posterior oropharyngeal erythema. Eyes: Conjunctivae are normal. Pupils are equal, round, and reactive to light. Right conjunctiva is not injected. Left conjunctiva is not injected. Neck: Neck supple. No thyromegaly present. Cardiovascular: Normal rate and regular rhythm. Pulmonary/Chest: Effort normal. No respiratory distress. He has no wheezes. He has no rales. Coarse expiratory rhonchi, especially L-side. Lymphadenopathy:     He has no cervical adenopathy. Skin: No rash noted. Nursing note and vitals reviewed. ASSESSMENT and PLAN    ICD-10-CM ICD-9-CM    1. Allergic conjunctivitis of both eyes H10.13 372.14 azelastine (OPTIVAR) 0.05 % ophthalmic solution   2. Bronchitis J40 490    3. Seasonal allergic rhinitis due to pollen J30.1 477.0 Azelastine (ASTEPRO) 0.15 % (205.5 mcg) nasal spray   4. Cough R05 786.2 guaiFENesin-codeine (ROBITUSSIN AC) 100-10 mg/5 mL solution   5. Smoker F17.200 305.1        AVS instructions reviewed with patient, pt verbalized understanding.

## 2018-05-09 NOTE — MR AVS SNAPSHOT
Francisco Rios 
 
 
 niki De Jesus [address] [facility] [address] [address] 
[phone] Patient: Tricia Varghese MRN: [id number] AML:6/51/1152 Visit Information Date & Time Provider Department Dept. Phone Encounter #  
 5/9/2018  2:30 PM Jeanette Cr, [address] [phone] [id number] Upcoming Health Maintenance Date Due Hepatitis C Screening 1950 DTaP/Tdap/Td series (1 - Tdap) 2/20/1969 FOBT Q 1 YEAR AGE 50-75 2/20/1998 ZOSTER VACCINE AGE 60> 12/20/2007 GLAUCOMA SCREENING Q2Y 2/20/2013 Pneumococcal 65+ Low/Medium Risk (1 of 2 - PCV13) 2/20/2013 MEDICARE YEARLY EXAM 3/20/2018 Influenza Age 5 to Adult 8/1/2018 Allergies as of 5/9/2018  Review Complete On: 5/9/2018 By: Jeanette Cr MD  
  
 Severity Noted Reaction Type Reactions Keflex [Cephalexin]  03/05/2018    Hives Morphine  03/05/2018    Hives Current Immunizations  Never Reviewed No immunizations on file. Not reviewed this visit You Were Diagnosed With   
  
 Codes Comments Allergic conjunctivitis of both eyes    -  Primary ICD-10-CM: H10.13 ICD-9-CM: 372.14 Bronchitis     ICD-10-CM: J40 ICD-9-CM: 013 Seasonal allergic rhinitis due to pollen     ICD-10-CM: J30.1 ICD-9-CM: 477.0 Cough     ICD-10-CM: R05 ICD-9-CM: 786.2 Smoker     ICD-10-CM: F62.455 ICD-9-CM: 305.1 Vitals BP Pulse Temp Resp Height(growth percentile) Weight(growth percentile) 141/84 (BP 1 Location: Right arm, BP Patient Position: Sitting) 68 98 °F (36.7 °C) (Oral) 16 4' 9\" (1.448 m) 127 lb (57.6 kg) SpO2 BMI Smoking Status 99% 27.48 kg/m2 Current Every Day Smoker Vitals History BMI and BSA Data Body Mass Index Body Surface Area  
 27.48 kg/m 2 1.52 m 2 Preferred Pharmacy Pharmacy Name Phone [facility] [address] [place] [address] Arlene Mckenzie Your Updated Medication List  
  
   
This list is accurate as of 5/9/18  2:59 PM.  Always use your most recent med list.  
  
  
  
  
 albuterol sulfate 2.5 mg/0.5 mL Nebu nebulizer solution Commonly known as:  PROVENTIL;VENTOLIN  
by Nebulization route once. * azelastine 0.05 % ophthalmic solution Commonly known as:  OPTIVAR Administer 1 Drop to both eyes two (2) times daily as needed. (allergies) * Azelastine 0.15 % (205.5 mcg) nasal spray Commonly known as:  ASTEPRO  
2 Sprays by Both Nostrils route two (2) times daily as needed. (seasonal allergies) azithromycin 500 mg Tab Commonly known as:  [place] Take 1 Tab by mouth daily for 3 days. CRESTOR PO Take  by mouth.  
  
 guaiFENesin-codeine 100-10 mg/5 mL solution Commonly known as:  ROBITUSSIN AC Take 10 mL by mouth three (3) times daily as needed for Cough. Max Daily Amount: 30 mL. LISINOPRIL PO Take  by mouth.  
  
 meloxicam 7.5 mg tablet Commonly known as:  MOBIC Take 1 Tab by mouth daily. METOPROLOL SUCCINATE PO Take  by mouth. OMEPRAZOLE PO Take  by mouth. PLAVIX 75 mg Tab Generic drug:  clopidogrel Take  by mouth. STIOLTO RESPIMAT IN Take  by inhalation. TAMSULOSIN PO Take  by mouth. * Notice: This list has 2 medication(s) that are the same as other medications prescribed for you. Read the directions carefully, and ask your doctor or other care provider to review them with you. Prescriptions Printed Refills  
 guaiFENesin-codeine (ROBITUSSIN AC) 100-10 mg/5 mL solution 0 Sig: Take 10 mL by mouth three (3) times daily as needed for Cough. Max Daily Amount: 30 mL. Class: Print Route: Oral  
  
Prescriptions Sent to Pharmacy Refills  
 azelastine (OPTIVAR) 0.05 % ophthalmic solution 5 Sig: Administer 1 Drop to both eyes two (2) times daily as needed. (allergies)  Class: Normal  
 Pharmacy: [facility] [address] [place] [address] #: [phone] Route: Both Eyes Azelastine (ASTEPRO) 0.15 % (205.5 mcg) nasal spray 4 Si Sprays by Both Nostrils route two (2) times daily as needed. (seasonal allergies) Class: Normal  
 Pharmacy: [facility] [address] Ph #: [phone] Route: Both Nostrils  
 azithromycin (ZITHROMAX TRI-JESUS) 500 mg tab 0 Sig: Take 1 Tab by mouth daily for 3 days. Class: Normal  
 Pharmacy: [facility] [address] Ph #: [phone] Route: Oral  
  
Patient Instructions Take the cough medication at night. There are drops for your eyes to use twice daily as needed for allergies. There is a nasal spray to use twice daily for congestion due to pollen. Recheck 1 week if your not better. YOU NEED TO QUIT SMOKING AS SOON AS POSSIBLE. Take the antibiotic for 3 days with dinner. Introducing [facility]! [employer] introduces [employer] patient portal. Now you can access parts of your medical record, email your doctor's office, and request medication refills online. 1. In your internet browser, go to [email or web address] 2. Click on the First Time User? Click Here link in the Sign In box. You will see the New Member Sign Up page. 3. Enter your [employer] Access Code exactly as it appears below. You will not need to use this code after youve completed the sign-up process. If you do not sign up before the expiration date, you must request a new code. · [employer] Access Code: [email or web address] Expires: 6/3/2018  3:47 PM 
 
4. Enter the last four digits of your Social Security Number (xxxx) and Date of Birth (mm/dd/yyyy) as indicated and click Submit. You will be taken to the next sign-up page. 5. Create a [employer] ID. This will be your [employer] login ID and cannot be changed, so think of one that is secure and easy to remember. 6. Create a [employer] password. You can change your password at any time. 7. Enter your Password Reset Question and Answer. This can be used at a later time if you forget your password. 8. Enter your e-mail address. You will receive e-mail notification when new information is available in [address] 9. Click Sign Up. You can now view and download portions of your medical record. 10. Click the Download Summary menu link to download a portable copy of your medical information. If you have questions, please visit the Frequently Asked Questions section of the [employer] website. Remember, [employer] is NOT to be used for urgent needs. For medical emergencies, dial 911. Now available from your iPhone and Android! Please provide this summary of care documentation to your next provider. Your primary care clinician is listed as NONE. If you have any questions after today's visit, please call [phone].

## 2018-05-09 NOTE — PATIENT INSTRUCTIONS
Take the cough medication at night. There are drops for your eyes to use twice daily as needed for allergies. There is a nasal spray to use twice daily for congestion due to pollen. Recheck 1 week if your not better. YOU NEED TO QUIT SMOKING AS SOON AS POSSIBLE. Take the antibiotic for 3 days with dinner.

## 2018-05-09 NOTE — PROGRESS NOTES
Rm 1  Pt presents to the clinic complaining of allergy symptoms. Pt's main concern is his vision. Depression Screening:  PHQ over the last two weeks 5/9/2018 4/19/2018 3/16/2018 3/5/2018   Little interest or pleasure in doing things Not at all Not at all Not at all Not at all   Feeling down, depressed or hopeless Not at all Not at all Not at all Not at all   Total Score PHQ 2 0 0 0 0       Learning Assessment:  Learning Assessment 3/5/2018   PRIMARY LEARNER Patient   HIGHEST LEVEL OF EDUCATION - PRIMARY LEARNER  DID NOT GRADUATE HIGH SCHOOL   PRIMARY LANGUAGE ENGLISH   LEARNER PREFERENCE PRIMARY READING   ANSWERED BY patient   RELATIONSHIP SELF       Abuse Screening:  No flowsheet data found. Health Maintenance reviewed and discussed per provider: yes     Coordination of Care:    1. Have you been to the ER, urgent care clinic since your last visit? Hospitalized since your last visit? no    2. Have you seen or consulted any other health care providers outside of the [address] [place] [address] since your last visit? Include any pap smears or colon screening.  no

## 2018-05-23 DIAGNOSIS — M25.512 CHRONIC PAIN OF BOTH SHOULDERS: ICD-10-CM

## 2018-05-23 DIAGNOSIS — M25.511 CHRONIC PAIN OF BOTH SHOULDERS: ICD-10-CM

## 2018-05-23 DIAGNOSIS — G89.29 CHRONIC PAIN OF BOTH SHOULDERS: ICD-10-CM

## 2018-05-23 RX ORDER — CLOPIDOGREL BISULFATE 75 MG/1
75 TABLET ORAL DAILY
Qty: 30 TAB | Refills: 1 | Status: SHIPPED | OUTPATIENT
Start: 2018-05-23 | End: 2018-07-25 | Stop reason: SDUPTHER

## 2018-05-23 RX ORDER — MELOXICAM 7.5 MG/1
7.5 TABLET ORAL DAILY
Qty: 30 TAB | Refills: 1 | OUTPATIENT
Start: 2018-05-23

## 2018-05-24 ENCOUNTER — IMPORTED ENCOUNTER (OUTPATIENT)
Dept: URBAN - METROPOLITAN AREA CLINIC 1 | Facility: CLINIC | Age: 68
End: 2018-05-24

## 2018-05-24 PROBLEM — Z96.1: Noted: 2018-05-24

## 2018-05-24 PROCEDURE — 92015 DETERMINE REFRACTIVE STATE: CPT

## 2018-05-24 PROCEDURE — 99024 POSTOP FOLLOW-UP VISIT: CPT

## 2018-05-24 NOTE — PATIENT DISCUSSION
POM#1 CE/IOL Standard OS doing well. Continue Lotemax/Durezol/Prednisolone BID until gone. Continue Prolensa/Ilevro/Acular QD until gone. Return for an appointment for a 30/OCT in 4 months with Dr. Anahy Campbell.

## 2018-06-13 ENCOUNTER — OFFICE VISIT (OUTPATIENT)
Dept: FAMILY MEDICINE CLINIC | Age: 68
End: 2018-06-13

## 2018-06-13 VITALS
WEIGHT: 127 LBS | RESPIRATION RATE: 18 BRPM | OXYGEN SATURATION: 94 % | HEART RATE: 63 BPM | HEIGHT: 60 IN | BODY MASS INDEX: 24.94 KG/M2 | DIASTOLIC BLOOD PRESSURE: 91 MMHG | TEMPERATURE: 97.4 F | SYSTOLIC BLOOD PRESSURE: 143 MMHG

## 2018-06-13 DIAGNOSIS — M19.011 PRIMARY OSTEOARTHRITIS OF RIGHT SHOULDER: Primary | ICD-10-CM

## 2018-06-13 DIAGNOSIS — Z00.00 ROUTINE GENERAL MEDICAL EXAMINATION AT A HEALTH CARE FACILITY: ICD-10-CM

## 2018-06-13 DIAGNOSIS — R05.9 COUGH: ICD-10-CM

## 2018-06-13 RX ORDER — TRAMADOL HYDROCHLORIDE 50 MG/1
50 TABLET ORAL
Qty: 30 TAB | Refills: 0 | Status: SHIPPED | OUTPATIENT
Start: 2018-06-13 | End: 2018-06-27

## 2018-06-13 NOTE — MR AVS SNAPSHOT
[address] [place] [address] 83 [phone] 
[phone] Patient: Emani Chandler MRN: [id number] HKE:9/78/1810 Visit Information Date & Time Provider Department Dept. Phone Encounter #  
 6/13/2018  3:45 PM Karthik Milton PA-C [facility] [phone] [id number] Follow-up Instructions Return in about 4 weeks (around 7/11/2018) for health maintenance. Upcoming Health Maintenance Date Due Hepatitis C Screening 1950 DTaP/Tdap/Td series (1 - Tdap) 2/20/1969 FOBT Q 1 YEAR AGE 50-75 2/20/1998 ZOSTER VACCINE AGE 60> 12/20/2007 GLAUCOMA SCREENING Q2Y 2/20/2013 Pneumococcal 65+ Low/Medium Risk (1 of 2 - PCV13) 2/20/2013 MEDICARE YEARLY EXAM 3/20/2018 Influenza Age 5 to Adult 8/1/2018 Allergies as of 6/13/2018  Review Complete On: 6/13/2018 By: Karthik Milton PA-C Severity Noted Reaction Type Reactions Keflex [Cephalexin]  03/05/2018    Hives Morphine  03/05/2018    Hives Current Immunizations  Never Reviewed No immunizations on file. Not reviewed this visit You Were Diagnosed With   
  
 Codes Comments Primary osteoarthritis of right shoulder    -  Primary ICD-10-CM: M19.011 
ICD-9-CM: 715.11 Vitals BP Pulse Temp Resp Height(growth percentile) Weight(growth percentile) (!) 143/91 (BP 1 Location: Right arm, BP Patient Position: Sitting) 63 97.4 °F (36.3 °C) (Oral) 18 4' 9\" (1.448 m) 127 lb (57.6 kg) SpO2 BMI Smoking Status 94% 27.48 kg/m2 Current Every Day Smoker Vitals History BMI and BSA Data Body Mass Index Body Surface Area  
 27.48 kg/m 2 1.52 m 2 Preferred Pharmacy Pharmacy Name Phone [facility] [address] [place] [address] Arlene Mckenzie Your Updated Medication List  
  
   
This list is accurate as of 6/13/18  5:27 PM.  Always use your most recent med list.  
  
  
  
  
 albuterol sulfate 2.5 mg/0.5 mL Nebu nebulizer solution Commonly known as:  PROVENTIL;VENTOLIN  
by Nebulization route once. * azelastine 0.05 % ophthalmic solution Commonly known as:  OPTIVAR Administer 1 Drop to both eyes two (2) times daily as needed. (allergies) * Azelastine 0.15 % (205.5 mcg) nasal spray Commonly known as:  ASTEPRO  
2 Sprays by Both Nostrils route two (2) times daily as needed. (seasonal allergies) clopidogrel 75 mg Tab Commonly known as:  PLAVIX Take 1 Tab by mouth daily. CRESTOR PO Take  by mouth. LISINOPRIL PO Take  by mouth.  
  
 meloxicam 7.5 mg tablet Commonly known as:  MOBIC Take 1 Tab by mouth daily. METOPROLOL SUCCINATE PO Take  by mouth. OMEPRAZOLE PO Take  by mouth. STIOLTO RESPIMAT IN Take  by inhalation. TAMSULOSIN PO Take  by mouth. traMADol 50 mg tablet Commonly known as:  ULTRAM  
Take 1 Tab by mouth every eight (8) hours as needed for Pain for up to 14 days. Max Daily Amount: 150 mg.  
  
 * Notice: This list has 2 medication(s) that are the same as other medications prescribed for you. Read the directions carefully, and ask your doctor or other care provider to review them with you. Prescriptions Printed Refills  
 traMADol (ULTRAM) 50 mg tablet 0 Sig: Take 1 Tab by mouth every eight (8) hours as needed for Pain for up to 14 days. Max Daily Amount: 150 mg.  
 Class: Print Route: Oral  
  
We Performed the Following REFERRAL TO ORTHOPEDIC SURGERY [REF62 Custom] Follow-up Instructions Return in about 4 weeks (around 7/11/2018) for health maintenance. Referral Information Referral ID Referred By Referred To  
  
 [id number] PHIL KWAN MD [address] Phone: [phone] Fax: [phone] Visits Status Start Date End Date 1 New Request 6/13/18 6/13/19 If your referral has a status of pending review or denied, additional information will be sent to support the outcome of this decision. Patient Instructions Use the tramadol sparingly. Follow up with ortho as soon as you are able ([address] should be calling you). Please have your doctor's office in [place] send us your records. Return in the next month or so for the other health maintenance items. Medicare Wellness Visit, Male The best way to live healthy is to have a lifestyle where you eat a well-balanced diet, exercise regularly, limit alcohol use, and quit all forms of tobacco/nicotine, if applicable. Regular preventive services are another way to keep healthy. Preventive services (vaccines, screening tests, monitoring & exams) can help personalize your care plan, which helps you manage your own care. Screening tests can find health problems at the earliest stages, when they are easiest to treat. [facility] follows the current, evidence-based guidelines published by the [facility] Dashawn Mae ([facility]) when recommending preventive services for our patients. Because we follow these guidelines, sometimes recommendations change over time as research supports it. (For example, a prostate screening blood test is no longer routinely recommended for men with no symptoms.) Of course, you and your provider may decide to screen more often for some diseases, based on your risk and co-morbidities (chronic disease you are already diagnosed with). Preventive services for you include: - Medicare offers their members a free annual wellness visit, which is time for you and your primary care provider to discuss and plan for your preventive service needs. Take advantage of this benefit every year! -All people over age 72 should receive the recommended pneumonia vaccines. Current USPSTF guidelines recommend a series of two vaccines for the best pneumonia protection.  
 
-All adults should have a yearly flu vaccine and a tetanus vaccine every 10 years. All adults age 61 years should receive a shingles vaccine once in their lifetime.   
 
-All adults age 38-68 years who are overweight should have a diabetes screening test once every three years.  
 
-Other screening tests & preventive services for persons with diabetes include: an eye exam to screen for diabetic retinopathy, a kidney function test, a foot exam, and stricter control over your cholesterol.  
 
-Cardiovascular screening for adults with routine risk involves an electrocardiogram (ECG) at intervals determined by the provider.  
 
-Colorectal cancer screenings should be done for adults age 54-65 years with normal risk. There are a number of acceptable methods of screening for this type of cancer. Each test has its own benefits and drawbacks. Discuss with your provider what is most appropriate for you during your annual wellness visit. The different tests include: colonoscopy (considered the best screening method), a fecal occult blood test, a fecal DNA test, and sigmoidoscopy. 
 
-All adults born between [facility] should be screened once for Hepatitis C. 
 
-An Abdominal Aortic Aneurysm (AAA) Screening is recommended for men age 73-68 who has ever smoked in their lifetime. Here is a list of your current Health Maintenance items (your personalized list of preventive services) with a due date: 
Health Maintenance Due Topic Date Due  
 Hepatitis C Test  1950  DTaP/Tdap/Td  (1 - Tdap) 02/20/1969  Stool testing for trace blood  02/20/1998  Shingles Vaccine  12/20/2007  Glaucoma Screening   02/20/2013  Pneumococcal Vaccine (1 of 2 - PCV13) 02/20/2013 [address] [place] [address] Annual Well Visit  03/20/2018 Introducing [facility]! Aurea Brush introduces [email or web address] patient portal. Now you can access parts of your medical record, email your doctor's office, and request medication refills online. 1. In your internet browser, go to [email or web address] 2. Click on the First Time User? Click Here link in the Sign In box. You will see the New Member Sign Up page. 3. Enter your [email or web address] Access Code exactly as it appears below. You will not need to use this code after youve completed the sign-up process. If you do not sign up before the expiration date, you must request a new code. · [email or web address] Access Code: [email or web address] Expires: 9/11/2018  4:27 PM 
 
4. Enter the last four digits of your Social Security Number (xxxx) and Date of Birth (mm/dd/yyyy) as indicated and click Submit. You will be taken to the next sign-up page. 5. Create a [email or web address] ID. This will be your [email or web address] login ID and cannot be changed, so think of one that is secure and easy to remember. 6. Create a [email or web address] password. You can change your password at any time. 7. Enter your Password Reset Question and Answer. This can be used at a later time if you forget your password. 8. Enter your e-mail address. You will receive e-mail notification when new information is available in [address] 9. Click Sign Up. You can now view and download portions of your medical record. 10. Click the Download Summary menu link to download a portable copy of your medical information. If you have questions, please visit the Frequently Asked Questions section of the [email or web address] website. Remember, [email or web address] is NOT to be used for urgent needs. For medical emergencies, dial 911. Now available from your iPhone and Android! Please provide this summary of care documentation to your next provider. Your primary care clinician is listed as NONE. If you have any questions after today's visit, please call [phone].

## 2018-06-13 NOTE — PATIENT INSTRUCTIONS
Use the tramadol sparingly. Follow up with ortho as soon as you are able ([address] should be calling you). Please have your doctor's office in [place] send us your records. Return in the next month or so for the other health maintenance items. Medicare Wellness Visit, Male    The best way to live healthy is to have a lifestyle where you eat a well-balanced diet, exercise regularly, limit alcohol use, and quit all forms of tobacco/nicotine, if applicable. Regular preventive services are another way to keep healthy. Preventive services (vaccines, screening tests, monitoring & exams) can help personalize your care plan, which helps you manage your own care. Screening tests can find health problems at the earliest stages, when they are easiest to treat. [address] [facility] follows the current, evidence-based guidelines published by the [facility]awn Mae ([facility]) when recommending preventive services for our patients. Because we follow these guidelines, sometimes recommendations change over time as research supports it. (For example, a prostate screening blood test is no longer routinely recommended for men with no symptoms.)    Of course, you and your provider may decide to screen more often for some diseases, based on your risk and co-morbidities (chronic disease you are already diagnosed with). Preventive services for you include:    - Medicare offers their members a free annual wellness visit, which is time for you and your primary care provider to discuss and plan for your preventive service needs. Take advantage of this benefit every year!    -All people over age 72 should receive the recommended pneumonia vaccines. Current USPSTF guidelines recommend a series of two vaccines for the best pneumonia protection.     -All adults should have a yearly flu vaccine and a tetanus vaccine every 10 years.  All adults age 61 years should receive a shingles vaccine once in their lifetime.      -All adults age 38-68 years who are overweight should have a diabetes screening test once every three years.     -Other screening tests & preventive services for persons with diabetes include: an eye exam to screen for diabetic retinopathy, a kidney function test, a foot exam, and stricter control over your cholesterol.     -Cardiovascular screening for adults with routine risk involves an electrocardiogram (ECG) at intervals determined by the provider.     -Colorectal cancer screenings should be done for adults age 54-65 years with normal risk. There are a number of acceptable methods of screening for this type of cancer. Each test has its own benefits and drawbacks. Discuss with your provider what is most appropriate for you during your annual wellness visit. The different tests include: colonoscopy (considered the best screening method), a fecal occult blood test, a fecal DNA test, and sigmoidoscopy.    -All adults born between [facility] should be screened once for Hepatitis C.    -An Abdominal Aortic Aneurysm (AAA) Screening is recommended for men age 73-68 who has ever smoked in their lifetime.      Here is a list of your current Health Maintenance items (your personalized list of preventive services) with a due date:  Health Maintenance Due   Topic Date Due    Hepatitis C Test  1950    DTaP/Tdap/Td  (1 - Tdap) 02/20/1969    Stool testing for trace blood  02/20/1998    Shingles Vaccine  12/20/2007    Glaucoma Screening   02/20/2013    Pneumococcal Vaccine (1 of 2 - PCV13) 02/20/2013    Annual Well Visit  03/20/2018

## 2018-06-13 NOTE — PROGRESS NOTES
Rm:8    Chief Complaint   Patient presents with    Shoulder Pain     pt states pain is now radiating all over his body     Depression Screening:  PHQ over the last two weeks 6/13/2018 5/9/2018 4/19/2018 3/16/2018 3/5/2018   Little interest or pleasure in doing things Not at all Not at all Not at all Not at all Not at all   Feeling down, depressed or hopeless Not at all Not at all Not at all Not at all Not at all   Total Score PHQ 2 0 0 0 0 0       Learning Assessment:  Learning Assessment 3/5/2018   PRIMARY LEARNER Patient   HIGHEST LEVEL OF EDUCATION - PRIMARY LEARNER  DID NOT GRADUATE HIGH SCHOOL   PRIMARY LANGUAGE ENGLISH   LEARNER PREFERENCE PRIMARY READING   ANSWERED BY patient   RELATIONSHIP SELF       Abuse Screening:  No flowsheet data found. Health Maintenance reviewed and discussed per provider: yes     Coordination of Care:    1. Have you been to the ER, urgent care clinic since your last visit? Hospitalized since your last visit? no    2. Have you seen or consulted any other health care providers outside of the [address] [place] [address] since your last visit? Include any pap smears or colon screening. no          This is an Initial Medicare Annual Wellness Exam (AWV) (Performed 12 months after IPPE or effective date of Medicare Part B enrollment, Once in a lifetime)    I have reviewed the patient's medical history in detail and updated the computerized patient record.      History     Past Medical History:   Diagnosis Date    Arthritis     Asthma     Chronic obstructive pulmonary disease (HCC)     Chronic pain     GERD (gastroesophageal reflux disease)     Heart attack ([facility] [address] 1998    Hyperlipidemia     Hypertension       Past Surgical History:   Procedure Laterality Date    HX BACK SURGERY      HX CATARACT REMOVAL Bilateral 03/14/2018    HX RADICAL PROSTATECTOMY       Current Outpatient Prescriptions   Medication Sig Dispense Refill    clopidogrel (PLAVIX) 75 mg tab Take 1 Tab by mouth daily. 30 Tab 1    azelastine (OPTIVAR) 0.05 % ophthalmic solution Administer 1 Drop to both eyes two (2) times daily as needed. (allergies) 6 mL 5    Azelastine (ASTEPRO) 0.15 % (205.5 mcg) nasal spray 2 Sprays by Both Nostrils route two (2) times daily as needed. (seasonal allergies) 1 Bottle 4    meloxicam (MOBIC) 7.5 mg tablet Take 1 Tab by mouth daily. 30 Tab 1    METOPROLOL SUCCINATE PO Take  by mouth.  LISINOPRIL PO Take  by mouth.  ROSUVASTATIN CALCIUM (CRESTOR PO) Take  by mouth.  OMEPRAZOLE PO Take  by mouth.  TAMSULOSIN HCL (TAMSULOSIN PO) Take  by mouth.  TIOTROPIUM BR/OLODATEROL HCL (STIOLTO RESPIMAT IN) Take  by inhalation.  albuterol sulfate (PROVENTIL;VENTOLIN) 2.5 mg/0.5 mL nebu nebulizer solution by Nebulization route once.  guaiFENesin-codeine (ROBITUSSIN AC) 100-10 mg/5 mL solution Take 10 mL by mouth three (3) times daily as needed for Cough. Max Daily Amount: 30 mL. 120 mL 0     Allergies   Allergen Reactions    Keflex [Cephalexin] Hives    Morphine Hives     Family History   Problem Relation Age of Onset    No Known Problems Mother     No Known Problems Father      Social History   Substance Use Topics    Smoking status: Current Every Day Smoker     Packs/day: 0.50     Years: 40.00    Smokeless tobacco: Never Used    Alcohol use No     Patient Active Problem List   Diagnosis Code   (none) - all problems resolved or deleted       Depression Risk Factor Screening:     PHQ over the last two weeks 6/13/2018   Little interest or pleasure in doing things Not at all   Feeling down, depressed or hopeless Not at all   Total Score PHQ 2 0     Alcohol Risk Factor Screening: You do not drink alcohol or very rarely. Functional Ability and Level of Safety:     Hearing Loss  Hearing is good. The patient needs further evaluation. Activities of Daily Living  The home contains: no safety equipment.   Patient does total self care    Fall Risk  Fall Risk Assessment, last 12 mths 6/13/2018   Able to walk? Yes   Fall in past 12 months? No       Abuse Screen  Patient is not abused    Cognitive Screening   Evaluation of Cognitive Function:  Has your family/caregiver stated any concerns about your memory: no  Normal    Patient Care Team   Patient Care Team:  None as PCP - General    Assessment/Plan   Education and counseling provided:  Are appropriate based on today's review and evaluation    Diagnoses and all orders for this visit:    1. Primary osteoarthritis of right shoulder  -     traMADol (ULTRAM) 50 mg tablet; Take 1 Tab by mouth every eight (8) hours as needed for Pain for up to 14 days. Max Daily Amount: 150 mg.  -     REFERRAL TO ORTHOPEDIC SURGERY    2. Routine general medical examination at a health care facility       Health Maintenance Due   Topic Date Due    Hepatitis C Screening  1950    DTaP/Tdap/Td series (1 - Tdap) 02/20/1969    FOBT Q 1 YEAR AGE 50-75  02/20/1998    ZOSTER VACCINE AGE 60>  12/20/2007    GLAUCOMA SCREENING Q2Y  02/20/2013    Pneumococcal 65+ Low/Medium Risk (1 of 2 - PCV13) 02/20/2013    MEDICARE YEARLY EXAM  03/20/2018     Pt declines any health maintenance updates today, but indicates he has already gotten his FOBT for this year. He agrees to have records sent from his doctor in [place]. He is encouraged to return in the next month or so to get the recommended vaccinations and hep-C screening.      DERIC NolascoC

## 2018-06-13 NOTE — PROGRESS NOTES
HISTORY OF PRESENT ILLNESS  Denece Keisha is a 77 y.o. male. HPI Comments: Pt presents with c/o pain in both shoulders, elbows, and into his fingers. This has been an ongoing issue for which he was previously referred to Dr Loretta Cortez. He is also due for his welcome to medicare visit. He was last seen by Dr Loretta Cortez on 3/16/18 with the following plan:  \"After discussing treatment options, patient's shoulders were injected with 4 cc Marcaine and 1/2 cc Celestone. He will follow up in 5 weeks with the results of her RUE EMG/NCV study. He will start a brief course of outpatient physical therapy to his shoulders. \"     Pt declined to go to physical therapy, because he didn't feel it would do him any good. He would like a CT or an MRI to find out why he is having pain and clicking in his shoulder. As we were walking out the door, he stated that he has had a cough ongoing for about a month for which he was given 3 days of antibiotic. States it got better, but then came back. The cough is productive of green mucus. Denies fever, but states he's been having some chills. He agrees to take some Mucinex and return if symptoms worsen or fail to improve within the next week or so. Pt declines pneumonia vaccine today. States he already got his colon cancer screening done this year with his doctor in [place] & agrees to have his records sent. Declines any other health maintenance items at this time. Shoulder Pain          Review of Systems   Constitutional: Positive for chills. Negative for fever. Respiratory: Positive for cough and sputum production (green mucus). Cardiovascular: Negative for chest pain. Musculoskeletal: Positive for joint pain (both shoulders, both elbows).      Visit Vitals    BP (!) 143/91 (BP 1 Location: Right arm, BP Patient Position: Sitting)    Pulse 63    Temp 97.4 °F (36.3 °C) (Oral)    Resp 18    Ht 4' 9\" (1.448 m)    Wt 127 lb (57.6 kg)    SpO2 94%    BMI 27.48 kg/m2 Physical Exam   Constitutional: He is oriented to person, place, and time. He appears well-developed and well-nourished. He is cooperative. No distress. Mildly hypertensive   HENT:   Head: Normocephalic and atraumatic. Right Ear: External ear normal.   Left Ear: External ear normal.   Nose: Nose normal. No nasal deformity. Eyes: Conjunctivae are normal.   Neck: Neck supple. Cardiovascular: Normal rate, regular rhythm and normal heart sounds. Exam reveals no gallop and no friction rub. No murmur heard. Pulses:       Radial pulses are 2+ on the right side, and 2+ on the left side. Pulmonary/Chest: Effort normal. No respiratory distress. He has no decreased breath sounds. He has wheezes (cleared with cough) in the left upper field. He has no rales. Musculoskeletal:        Right shoulder: He exhibits crepitus and pain. Lymphadenopathy:     He has no cervical adenopathy. Neurological: He is alert and oriented to person, place, and time. Skin: Skin is warm and dry. He is not diaphoretic. Psychiatric: He has a normal mood and affect. His speech is normal and behavior is normal. Thought content normal.   Nursing note and vitals reviewed. ASSESSMENT and PLAN    ICD-10-CM ICD-9-CM    1. Primary osteoarthritis of right shoulder M19.011 715.11 traMADol (ULTRAM) 50 mg tablet      REFERRAL TO ORTHOPEDIC SURGERY   2. Routine general medical examination at a health care facility Z00.00 V70.0    3. Cough R05 786.2      1. Pt does not feel the injections he received at Dr Lazaro Lozada office were helpful. He does not wish to return there nor go to physical therapy. He is referred to [address] [place] [address] for a second opinion. We'll defer any further imaging to ortho.  reviewed. No aberrant behavior noted. 2. Medicare wellness visit. 3. Pt seems to have some lingering symptoms of bronchitis. He was given robitussin-AC at his last visit with Dr Jenny Romero.  He is a chronic smoker, but does not appear ill today and lungs are clear after a cough. He indicates he has some mucinex at home and agrees to take it a couple of times a day and return if symptoms worsen. Pt verbalized understanding and agreement with the plan of care.     Adryan Alfaro PA-C

## 2018-06-23 ENCOUNTER — TELEPHONE (OUTPATIENT)
Dept: FAMILY MEDICINE CLINIC | Age: 68
End: 2018-06-23

## 2018-06-23 NOTE — TELEPHONE ENCOUNTER
[facility] DR SILVA HYDE called because their policy prohibits dispensing more than a weeks worth of opioids and opioid derivatives for acute pain indications. The pt's last prescription of Tramadol was written 1 tab q8h, dispense 30, which would be about a 10 day supply. I authorized the pharmacist to change the frequency to q6h with a dispense of 28. She thanked me for the call.

## 2018-07-25 ENCOUNTER — HOSPITAL ENCOUNTER (OUTPATIENT)
Dept: LAB | Age: 68
Discharge: HOME | End: 2018-07-25
Payer: MEDICARE

## 2018-07-25 ENCOUNTER — OFFICE VISIT (OUTPATIENT)
Dept: FAMILY MEDICINE CLINIC | Age: 68
End: 2018-07-25

## 2018-07-25 ENCOUNTER — HOSPITAL ENCOUNTER (OUTPATIENT)
Dept: PREADMISSION TESTING | Age: 68
Discharge: HOME | End: 2018-07-25
Payer: MEDICARE

## 2018-07-25 VITALS
BODY MASS INDEX: 25.8 KG/M2 | HEART RATE: 64 BPM | DIASTOLIC BLOOD PRESSURE: 85 MMHG | RESPIRATION RATE: 12 BRPM | WEIGHT: 131.4 LBS | OXYGEN SATURATION: 97 % | TEMPERATURE: 98.1 F | HEIGHT: 60 IN | SYSTOLIC BLOOD PRESSURE: 123 MMHG

## 2018-07-25 DIAGNOSIS — H10.13 ALLERGIC CONJUNCTIVITIS OF BOTH EYES: ICD-10-CM

## 2018-07-25 DIAGNOSIS — F17.200 SMOKER: ICD-10-CM

## 2018-07-25 DIAGNOSIS — Z01.818 PRE-OP TESTING: ICD-10-CM

## 2018-07-25 DIAGNOSIS — Z01.818 PREOPERATIVE CLEARANCE: ICD-10-CM

## 2018-07-25 DIAGNOSIS — M19.011 PRIMARY OSTEOARTHRITIS OF RIGHT SHOULDER: Primary | ICD-10-CM

## 2018-07-25 DIAGNOSIS — J44.9 CHRONIC OBSTRUCTIVE PULMONARY DISEASE, UNSPECIFIED COPD TYPE (HCC): ICD-10-CM

## 2018-07-25 DIAGNOSIS — J30.1 SEASONAL ALLERGIC RHINITIS DUE TO POLLEN: ICD-10-CM

## 2018-07-25 DIAGNOSIS — I10 ESSENTIAL HYPERTENSION: ICD-10-CM

## 2018-07-25 LAB
ANION GAP SERPL CALC-SCNC: 3 MMOL/L (ref 3–18)
BUN SERPL-MCNC: 24 MG/DL (ref 7–18)
BUN/CREAT SERPL: 19 (ref 12–20)
CALCIUM SERPL-MCNC: 9.2 MG/DL (ref 8.5–10.1)
CHLORIDE SERPL-SCNC: 112 MMOL/L (ref 100–108)
CO2 SERPL-SCNC: 30 MMOL/L (ref 21–32)
CREAT SERPL-MCNC: 1.26 MG/DL (ref 0.6–1.3)
ERYTHROCYTE [DISTWIDTH] IN BLOOD BY AUTOMATED COUNT: 13.7 % (ref 11.6–14.5)
GLUCOSE SERPL-MCNC: 93 MG/DL (ref 74–99)
HCT VFR BLD AUTO: 41.7 % (ref 36–48)
HGB BLD-MCNC: 13.6 G/DL (ref 13–16)
MCH RBC QN AUTO: 30.7 PG (ref 24–34)
MCHC RBC AUTO-ENTMCNC: 32.6 G/DL (ref 31–37)
MCV RBC AUTO: 94.1 FL (ref 74–97)
PLATELET # BLD AUTO: 295 K/UL (ref 135–420)
PMV BLD AUTO: 10.8 FL (ref 9.2–11.8)
POTASSIUM SERPL-SCNC: 5.5 MMOL/L (ref 3.5–5.5)
RBC # BLD AUTO: 4.43 M/UL (ref 4.7–5.5)
SODIUM SERPL-SCNC: 145 MMOL/L (ref 136–145)
WBC # BLD AUTO: 12 K/UL (ref 4.6–13.2)

## 2018-07-25 PROCEDURE — 80048 BASIC METABOLIC PNL TOTAL CA: CPT | Performed by: ORTHOPAEDIC SURGERY

## 2018-07-25 PROCEDURE — 36415 COLL VENOUS BLD VENIPUNCTURE: CPT | Performed by: ORTHOPAEDIC SURGERY

## 2018-07-25 PROCEDURE — 85027 COMPLETE CBC AUTOMATED: CPT | Performed by: ORTHOPAEDIC SURGERY

## 2018-07-25 PROCEDURE — 93005 ELECTROCARDIOGRAM TRACING: CPT

## 2018-07-25 RX ORDER — ALBUTEROL SULFATE 90 UG/1
2 AEROSOL, METERED RESPIRATORY (INHALATION) AS NEEDED
Refills: 1 | COMMUNITY
Start: 2018-05-23 | End: 2018-07-25 | Stop reason: SDUPTHER

## 2018-07-25 RX ORDER — DEXAMETHASONE 4 MG/1
1 TABLET ORAL AS NEEDED
Qty: 1 INHALER | Refills: 5 | Status: SHIPPED | OUTPATIENT
Start: 2018-07-25

## 2018-07-25 RX ORDER — AZELASTINE HYDROCHLORIDE 0.5 MG/ML
1 SOLUTION/ DROPS OPHTHALMIC
Qty: 6 ML | Refills: 5 | Status: SHIPPED | OUTPATIENT
Start: 2018-07-25

## 2018-07-25 RX ORDER — NEBIVOLOL HYDROCHLORIDE 5 MG/1
5 TABLET ORAL DAILY
Qty: 30 TAB | Refills: 3 | Status: SHIPPED | OUTPATIENT
Start: 2018-07-25

## 2018-07-25 RX ORDER — AZELASTINE HCL 205.5 UG/1
2 SPRAY NASAL
Qty: 1 BOTTLE | Refills: 4 | Status: SHIPPED | OUTPATIENT
Start: 2018-07-25

## 2018-07-25 RX ORDER — CLOPIDOGREL BISULFATE 75 MG/1
75 TABLET ORAL DAILY
Qty: 30 TAB | Refills: 1 | Status: SHIPPED | OUTPATIENT
Start: 2018-07-25

## 2018-07-25 RX ORDER — NEBIVOLOL HYDROCHLORIDE 5 MG/1
5 TABLET ORAL DAILY
Refills: 3 | COMMUNITY
Start: 2018-05-21 | End: 2018-07-25 | Stop reason: SDUPTHER

## 2018-07-25 RX ORDER — FLUTICASONE PROPIONATE 50 MCG
2 SPRAY, SUSPENSION (ML) NASAL DAILY
Refills: 2 | COMMUNITY
Start: 2018-04-20 | End: 2018-07-25 | Stop reason: SDUPTHER

## 2018-07-25 RX ORDER — TAMSULOSIN HYDROCHLORIDE 0.4 MG/1
0.4 CAPSULE ORAL DAILY
Refills: 3 | COMMUNITY
Start: 2018-05-23 | End: 2018-07-25 | Stop reason: SDUPTHER

## 2018-07-25 RX ORDER — FLUTICASONE PROPIONATE 50 MCG
2 SPRAY, SUSPENSION (ML) NASAL DAILY
Qty: 1 BOTTLE | Refills: 2 | Status: SHIPPED | OUTPATIENT
Start: 2018-07-25

## 2018-07-25 RX ORDER — LINACLOTIDE 290 UG/1
290 CAPSULE, GELATIN COATED ORAL AS NEEDED
Refills: 3 | Status: CANCELLED | OUTPATIENT
Start: 2018-07-25

## 2018-07-25 RX ORDER — TAMSULOSIN HYDROCHLORIDE 0.4 MG/1
0.4 CAPSULE ORAL DAILY
Qty: 90 CAP | Refills: 1 | Status: SHIPPED | OUTPATIENT
Start: 2018-07-25

## 2018-07-25 RX ORDER — DEXAMETHASONE 4 MG/1
1 TABLET ORAL AS NEEDED
Refills: 5 | COMMUNITY
Start: 2018-07-10 | End: 2018-07-25 | Stop reason: SDUPTHER

## 2018-07-25 RX ORDER — ALBUTEROL SULFATE 90 UG/1
2 AEROSOL, METERED RESPIRATORY (INHALATION) AS NEEDED
Qty: 1 INHALER | Refills: 1 | Status: SHIPPED | OUTPATIENT
Start: 2018-07-25 | End: 2018-07-26 | Stop reason: SDUPTHER

## 2018-07-25 RX ORDER — LINACLOTIDE 290 UG/1
1 CAPSULE, GELATIN COATED ORAL AS NEEDED
Refills: 3 | COMMUNITY
Start: 2018-07-12

## 2018-07-25 NOTE — PATIENT INSTRUCTIONS
OK for surgery pending labs. You need to quit smoking completely before surgery, and then after surgery continue to not smoke. Recheck as needed. Get me a date of your colonoscopy so I can record it in your chart.

## 2018-07-25 NOTE — PROGRESS NOTES
HISTORY OF PRESENT ILLNESS  Aden Workman is a 77 y.o. male. HPI Comments: Mr. Harpreet Priest is here to get a preop clearance for shoulder surgery next week. He continues to smoke but has cut down to 1/3 ppd. He also needs a refill of all of his medications because after surgery he will be going to [place] for a couple weeks to visit a sick family member. He has had two disc surgeries in his lower back, and a stent put in his heart. He's had no trouble with anesthesia. He knows he has to stop his Plavix 5 days before surgery. He had his labs done today (pending) and his EKG was ok. Pre-op Exam   Pertinent negatives include no chest pain, no abdominal pain, no headaches and no shortness of breath. Medication Refill   Pertinent negatives include no chest pain, no abdominal pain, no headaches and no shortness of breath. Review of Systems   Constitutional: Negative for chills and fever. Eyes: Negative for blurred vision and double vision. Respiratory: Negative for cough and shortness of breath. Cardiovascular: Negative for chest pain and palpitations. Gastrointestinal: Negative for abdominal pain, nausea and vomiting. Musculoskeletal: Positive for joint pain (shoulder pain bilat). Neurological: Negative for headaches. Physical Exam   Constitutional: He appears well-developed and well-nourished. HENT:   Right Ear: Tympanic membrane, external ear and ear canal normal.   Left Ear: Tympanic membrane, external ear and ear canal normal.   Nose: Nose normal.   Mouth/Throat: Uvula is midline, oropharynx is clear and moist and mucous membranes are normal. No posterior oropharyngeal erythema. Eyes: Conjunctivae are normal. Pupils are equal, round, and reactive to light. Right conjunctiva is not injected. Left conjunctiva is not injected. Neck: Neck supple. No thyromegaly present. Cardiovascular: Normal rate and regular rhythm.     Pulmonary/Chest: Effort normal and breath sounds normal. No respiratory distress. He has no wheezes. He has no rales. Abdominal: He exhibits no distension. There is no tenderness. Musculoskeletal:   Tender Both shoulders, decreased ROM R   Lymphadenopathy:     He has no cervical adenopathy. Skin: No rash noted. Nursing note and vitals reviewed. EKG viewed, rate 55, no acute changes or ectopy  ASSESSMENT and PLAN    ICD-10-CM ICD-9-CM    1. Primary osteoarthritis of right shoulder M19.011 715.11    2. Preoperative clearance Z01.818 V72.84    3. Chronic obstructive pulmonary disease, unspecified [facility] ([facility] J44.9 496 FLOVENT  mcg/actuation inhaler      PROAIR HFA 90 mcg/actuation inhaler      tiotropium-olodaterol (STIOLTO RESPIMAT) 2.5-2.5 mcg/actuation mist   4. Allergic conjunctivitis of both eyes H10.13 372.14 azelastine (OPTIVAR) 0.05 % ophthalmic solution   5. Seasonal allergic rhinitis due to pollen J30.1 477.0 fluticasone (FLONASE) 50 mcg/actuation nasal spray      Azelastine (ASTEPRO) 0.15 % (205.5 mcg) nasal spray   6. Smoker F17.200 305.1    7. Essential hypertension H87 353.4 BYSTOLIC 5 mg tablet      tamsulosin (FLOMAX) 0.4 mg capsule       Ok for surgery pending labs.     AVS instructions reviewed with patient, pt verbalized understanding

## 2018-07-25 NOTE — PROGRESS NOTES
Pt presents to the clinic for a pre-op appointment for his right shoulder x 6 months. Patient surgery is schedule for 18. Patient would also like a refill of his medications. Requested Prescriptions     Pending Prescriptions Disp Refills    BYSTOLIC 5 mg tablet  3     Sig: Take 1 Tab by mouth daily.  tamsulosin (FLOMAX) 0.4 mg capsule  3     Sig: Take 1 Cap by mouth daily.  LINZESS 290 mcg cap capsule  3     Sig: Take 1 Cap by mouth as needed.  fluticasone (FLONASE) 50 mcg/actuation nasal spray 1 Bottle 2     Si Sprays by Both Nostrils route daily.  FLOVENT  mcg/actuation inhaler 1 Inhaler 5     Sig: Take 1 Puff by inhalation as needed.  PROAIR HFA 90 mcg/actuation inhaler 1 Inhaler 1     Sig: Take 2 Puffs by inhalation as needed.  clopidogrel (PLAVIX) 75 mg tab 30 Tab 1     Sig: Take 1 Tab by mouth daily.  azelastine (OPTIVAR) 0.05 % ophthalmic solution 6 mL 5     Sig: Administer 1 Drop to both eyes two (2) times daily as needed. (allergies)    Azelastine (ASTEPRO) 0.15 % (205.5 mcg) nasal spray 1 Bottle 4     Si Sprays by Both Nostrils route two (2) times daily as needed.  (seasonal allergies)

## 2018-07-25 NOTE — PERIOP NOTES
PAT - SURGICAL PRE-ADMISSION INSTRUCTIONS    NAME:  Quincy Iqbal                                                          TODAY'S DATE:  7/25/2018    SURGERY DATE:  7/30/2018                                  SURGERY ARRIVAL TIME:   1330    1. Do NOT eat or drink anything, including candy or gum, after MIDNIGHT on 0500 , unless you have specific instructions from your Surgeon or Anesthesia Provider to do so. 2. No smoking on the day of surgery. 3. No alcohol 24 hours prior to the day of surgery. 4. No recreational drugs for one week prior to the day of surgery. 5. Leave all valuables, including money/purse, at home. 6. Remove all jewelry, nail polish, makeup (including mascara); no lotions, powders, deodorant, or perfume/cologne/after shave. 7. Glasses/Contact lenses and Dentures may be worn to the hospital.  They will be removed prior to surgery. 8. Call your doctor if symptoms of a cold or illness develop within 24 ours prior to surgery. 9. AN ADULT MUST DRIVE YOU HOME AFTER OUTPATIENT SURGERY. 10. If you are having an OUTPATIENT procedure, please make arrangements for a responsible adult to be with you for 24 hours after your surgery. 11. If you are admitted to the hospital, you will be assigned to a bed after surgery is complete. Normally a family member will not be able to see you until you are in your assigned bed. 15. Family is encouraged to accompany you to the hospital.  We ask visitors in the treatment area to be limited to ONE person at a time to ensure patient privacy. EXCEPTIONS WILL BE MADE AS NEEDED. 15. Children under 12 are discouraged from entering the treatment area and need to be supervised by an adult when in the waiting room. Special Instructions:    Bring inhaler. , Take these medications the morning of surgery with a sip of water:  Bystolic, STOP anticoagulants AT LEAST 1 WEEK PRIOR to your surgery or, follow other MD instructions:  Plavix stopped 7/23    Patient Prep:    use CHG solution    These surgical instructions were reviewed with Mahamed Argueta during the PAT visit. A printed copy of the instructions was provided to Mahamed Argueta. Directions: On the morning of surgery, please go to the [address] [facility]. Enter the building from the [facility] entrance, 1st floor (next to the Emergency Room entrance). Take the elevator to the 2nd floor. Sign in at the Registration Desk.     If you have any questions and/or concerns, please do not hesitate to call:  (Prior to the day of surgery)  [facility] unit:  [phone]  (Day of surgery)  [facility] unit:  [phone]

## 2018-07-25 NOTE — MR AVS SNAPSHOT
[facility] [address] [facility] [address] [address] 
[phone] Patient: Mele Valverde MRN: [id number] Visit Information Date & Time Provider Department Dept. Phone Encounter #  
 7/25/2018  3:00 PM Jessica Traylor, Sergio \[facility] [place] [facility]\"" [address] [phone] [id number] Follow-up Instructions Return if symptoms worsen or fail to improve. Upcoming Health Maintenance Date Due Hepatitis C Screening 1950 DTaP/Tdap/Td series (1 - Tdap) 2/20/1969 FOBT Q 1 YEAR AGE 50-75 2/20/1998 ZOSTER VACCINE AGE 60> 12/20/2007 GLAUCOMA SCREENING Q2Y 2/20/2013 Pneumococcal 65+ Low/Medium Risk (1 of 2 - PCV13) 2/20/2013 Influenza Age 5 to Adult 8/1/2018 MEDICARE YEARLY EXAM 6/14/2019 Allergies as of 7/25/2018  Review Complete On: 7/25/2018 By: Alfonso Tnaner LPN Severity Noted Reaction Type Reactions Keflex [Cephalexin]  03/05/2018    Hives Morphine  03/05/2018    Hives, Swelling Affected breathing Current Immunizations  Never Reviewed No immunizations on file. Not reviewed this visit You Were Diagnosed With   
  
 Codes Comments Primary osteoarthritis of right shoulder    -  Primary ICD-10-CM: M19.011 
ICD-9-CM: 715.11 Preoperative clearance     ICD-10-CM: C18.181 ICD-9-CM: V72.84 Chronic obstructive pulmonary disease, unspecified COPD type ([facility].)     ICD-10-CM: J44.9 ICD-9-CM: 686 Allergic conjunctivitis of both eyes     ICD-10-CM: H10.13 ICD-9-CM: 372.14 Seasonal allergic rhinitis due to pollen     ICD-10-CM: J30.1 ICD-9-CM: 477.0 Smoker     ICD-10-CM: F28.618 ICD-9-CM: 305.1 Essential hypertension     ICD-10-CM: I10 
ICD-9-CM: 401.9 Vitals BP Pulse Temp Resp Height(growth percentile) Weight(growth percentile)  123/85 (BP 1 Location: Right arm, BP Patient Position: Sitting) 64 98.1 °F (36.7 °C) (Oral) 12 4' 11\" (1.499 m) 131 lb 6.4 oz (59.6 kg) SpO2 BMI Smoking Status 97% 26.54 kg/m2 Current Every Day Smoker BMI and BSA Data Body Mass Index Body Surface Area  
 26.54 kg/m 2 1.58 m 2 Preferred Pharmacy Pharmacy Name Phone [facility] [address] [place] [address] Arlene Mckenzie Your Updated Medication List  
  
   
This list is accurate as of 7/25/18  3:43 PM.  Always use your most recent med list.  
  
  
  
  
 * azelastine 0.05 % ophthalmic solution Commonly known as:  OPTIVAR Administer 1 Drop to both eyes two (2) times daily as needed. (allergies) * Azelastine 0.15 % (205.5 mcg) nasal spray Commonly known as:  ASTEPRO  
2 Sprays by Both Nostrils route two (2) times daily as needed. (seasonal allergies) BYSTOLIC 5 mg tablet Generic drug:  nebivolol Take 1 Tab by mouth daily. clopidogrel 75 mg Tab Commonly known as:  PLAVIX Take 1 Tab by mouth daily. * fluticasone 50 mcg/actuation nasal spray Commonly known as:  Gabby Reges 2 Sprays by Both Nostrils route daily. * FLOVENT  mcg/actuation inhaler Generic drug:  fluticasone Take 1 Puff by inhalation as needed. LINZESS 290 mcg Cap capsule Generic drug:  linaclotide Take 1 Cap by mouth as needed. PROAIR HFA 90 mcg/actuation inhaler Generic drug:  albuterol Take 2 Puffs by inhalation as needed. * STIOLTO RESPIMAT IN Take  by inhalation. * tiotropium-olodaterol 2.5-2.5 mcg/actuation Mist  
Commonly known as:  Tonya Jaki Take 2 Actuation(s) by inhalation daily. tamsulosin 0.4 mg capsule Commonly known as:  FLOMAX Take 1 Cap by mouth daily. * Notice: This list has 6 medication(s) that are the same as other medications prescribed for you. Read the directions carefully, and ask your doctor or other care provider to review them with you. Prescriptions Sent to Pharmacy Refills BYSTOLIC 5 mg tablet 3 Sig: Take 1 Tab by mouth daily. Class: Normal  
 Pharmacy: [facility] [address] Ph #: [phone] Route: Oral  
 tamsulosin (FLOMAX) 0.4 mg capsule 1 Sig: Take 1 Cap by mouth daily. Class: Normal  
 Pharmacy: [facility] [address] Ph #: [phone] Route: Oral  
 fluticasone (FLONASE) 50 mcg/actuation nasal spray 2 Si Sprays by Both Nostrils route daily. Class: Normal  
 Pharmacy: [facility] [address] Ph #: [phone] Route: Both Nostrils FLOVENT  mcg/actuation inhaler 5 Sig: Take 1 Puff by inhalation as needed. Class: Normal  
 Pharmacy: [facility] [address] Ph #: [phone] Route: Inhalation PROAIR HFA 90 mcg/actuation inhaler 1 Sig: Take 2 Puffs by inhalation as needed. Class: Normal  
 Pharmacy: [facility] [address] Ph #: [phone] Route: Inhalation  
 clopidogrel (PLAVIX) 75 mg tab 1 Sig: Take 1 Tab by mouth daily. Class: Normal  
 Pharmacy: [facility] [address] Ph #: [phone] Route: Oral  
 azelastine (OPTIVAR) 0.05 % ophthalmic solution 5 Sig: Administer 1 Drop to both eyes two (2) times daily as needed. (allergies) Class: Normal  
 Pharmacy: [facility] [address] Ph #: [phone] Route: Both Eyes Azelastine (ASTEPRO) 0.15 % (205.5 mcg) nasal spray 4 Si Sprays by Both Nostrils route two (2) times daily as needed. (seasonal allergies)  Class: Normal  
 Pharmacy: [facility] [address] [place] [address] Ph #: [phone] Route: Both Nostrils  
 tiotropium-olodaterol (STIOLTO RESPIMAT) 2.5-2.5 mcg/actuation mist 3 Sig: Take 2 Actuation(s) by inhalation daily. Class: Normal  
 Pharmacy: [facility] [address] [place] [address] Ph #: [phone] Route: Inhalation Follow-up Instructions Return if symptoms worsen or fail to improve. Patient Instructions OK for surgery pending labs. You need to quit smoking completely before surgery, and then after surgery continue to not smoke. Recheck as needed. Get me a date of your colonoscopy so I can record it in your chart. Introducing [facility] SERVICES! Sravan Dent introduces [employer] patient portal. Now you can access parts of your medical record, email your doctor's office, and request medication refills online. 1. In your internet browser, go to [email or web address] 2. Click on the First Time User? Click Here link in the Sign In box. You will see the New Member Sign Up page. 3. Enter your [employer] Access Code exactly as it appears below. You will not need to use this code after youve completed the sign-up process. If you do not sign up before the expiration date, you must request a new code. · [employer] Access Code: [email or web address] Expires: 9/11/2018  4:27 PM 
 
4. Enter the last four digits of your Social Security Number (xxxx) and Date of Birth (mm/dd/yyyy) as indicated and click Submit. You will be taken to the next sign-up page. 5. Create a Exect ID. This will be your [employer] login ID and cannot be changed, so think of one that is secure and easy to remember. 6. Create a [employer] password. You can change your password at any time. 7. Enter your Password Reset Question and Answer. This can be used at a later time if you forget your password. 8. Enter your e-mail address. You will receive e-mail notification when new information is available in [address] 9. Click Sign Up. You can now view and download portions of your medical record. 10. Click the Download Summary menu link to download a portable copy of your medical information. If you have questions, please visit the Frequently Asked Questions section of the [employer] website. Remember, [employer] is NOT to be used for urgent needs. For medical emergencies, dial 911. Now available from your iPhone and Android! Please provide this summary of care documentation to your next provider. Your primary care clinician is listed as Andrea Trivedi. If you have any questions after today's visit, please call [phone].

## 2018-07-26 DIAGNOSIS — J44.9 CHRONIC OBSTRUCTIVE PULMONARY DISEASE, UNSPECIFIED COPD TYPE (HCC): ICD-10-CM

## 2018-07-26 LAB
ATRIAL RATE: 55 BPM
CALCULATED P AXIS, ECG09: 59 DEGREES
CALCULATED R AXIS, ECG10: 47 DEGREES
CALCULATED T AXIS, ECG11: 54 DEGREES
DIAGNOSIS, 93000: NORMAL
P-R INTERVAL, ECG05: 150 MS
Q-T INTERVAL, ECG07: 386 MS
QRS DURATION, ECG06: 86 MS
QTC CALCULATION (BEZET), ECG08: 369 MS
VENTRICULAR RATE, ECG03: 55 BPM

## 2018-07-26 RX ORDER — ALBUTEROL SULFATE 90 UG/1
2 AEROSOL, METERED RESPIRATORY (INHALATION) AS NEEDED
Qty: 1 INHALER | Refills: 1 | Status: SHIPPED | OUTPATIENT
Start: 2018-07-26 | End: 2018-07-31

## 2018-07-27 ENCOUNTER — ANESTHESIA EVENT (OUTPATIENT)
Dept: SURGERY | Age: 68
End: 2018-07-27
Payer: MEDICARE

## 2018-07-30 ENCOUNTER — HOSPITAL ENCOUNTER (OUTPATIENT)
Age: 68
Setting detail: OUTPATIENT SURGERY
Discharge: HOME | End: 2018-07-30
Attending: ORTHOPAEDIC SURGERY | Admitting: ORTHOPAEDIC SURGERY
Payer: MEDICARE

## 2018-07-30 ENCOUNTER — ANESTHESIA (OUTPATIENT)
Dept: SURGERY | Age: 68
End: 2018-07-30
Payer: MEDICARE

## 2018-07-30 VITALS
WEIGHT: 129 LBS | BODY MASS INDEX: 25.32 KG/M2 | HEART RATE: 92 BPM | RESPIRATION RATE: 20 BRPM | TEMPERATURE: 97 F | HEIGHT: 60 IN | DIASTOLIC BLOOD PRESSURE: 96 MMHG | SYSTOLIC BLOOD PRESSURE: 125 MMHG | OXYGEN SATURATION: 91 %

## 2018-07-30 DIAGNOSIS — M67.919 DISORDER OF ROTATOR CUFF, UNSPECIFIED LATERALITY: Primary | ICD-10-CM

## 2018-07-30 PROCEDURE — 64415 NJX AA&/STRD BRCH PLXS IMG: CPT | Performed by: ORTHOPAEDIC SURGERY

## 2018-07-30 PROCEDURE — 74011250637 HC RX REV CODE- 250/637: Performed by: ANESTHESIOLOGY

## 2018-07-30 PROCEDURE — 77030016678 HC BUR SHV4 S&N -B: Performed by: ORTHOPAEDIC SURGERY

## 2018-07-30 PROCEDURE — C1713 ANCHOR/SCREW BN/BN,TIS/BN: HCPCS | Performed by: ORTHOPAEDIC SURGERY

## 2018-07-30 PROCEDURE — 77030018834: Performed by: ORTHOPAEDIC SURGERY

## 2018-07-30 PROCEDURE — L3650 SO 8 ABD RESTRAINT PRE OTS: HCPCS | Performed by: ORTHOPAEDIC SURGERY

## 2018-07-30 PROCEDURE — 77030004451 HC BUR SHV S&N -B: Performed by: ORTHOPAEDIC SURGERY

## 2018-07-30 PROCEDURE — 76942 ECHO GUIDE FOR BIOPSY: CPT | Performed by: ORTHOPAEDIC SURGERY

## 2018-07-30 PROCEDURE — 77030002916 HC SUT ETHLN J&J -A: Performed by: ORTHOPAEDIC SURGERY

## 2018-07-30 PROCEDURE — 77030032490 HC SLV COMPR SCD KNE COVD -B: Performed by: ORTHOPAEDIC SURGERY

## 2018-07-30 PROCEDURE — 77030008683 HC TU ET CUF COVD -A: Performed by: ANESTHESIOLOGY

## 2018-07-30 PROCEDURE — 76210000020 HC REC RM PH II FIRST 0.5 HR: Performed by: ORTHOPAEDIC SURGERY

## 2018-07-30 PROCEDURE — 77030011640 HC PAD GRND REM COVD -A: Performed by: ORTHOPAEDIC SURGERY

## 2018-07-30 PROCEDURE — 74011250637 HC RX REV CODE- 250/637: Performed by: NURSE ANESTHETIST, CERTIFIED REGISTERED

## 2018-07-30 PROCEDURE — 77030020269 HC MISC IMPL: Performed by: ORTHOPAEDIC SURGERY

## 2018-07-30 PROCEDURE — 77030010427: Performed by: ORTHOPAEDIC SURGERY

## 2018-07-30 PROCEDURE — 76010000153 HC OR TIME 1.5 TO 2 HR: Performed by: ORTHOPAEDIC SURGERY

## 2018-07-30 PROCEDURE — 77030038012 HC WND COBLATN S&N -F: Performed by: ORTHOPAEDIC SURGERY

## 2018-07-30 PROCEDURE — 76060000034 HC ANESTHESIA 1.5 TO 2 HR: Performed by: ORTHOPAEDIC SURGERY

## 2018-07-30 PROCEDURE — 74011250636 HC RX REV CODE- 250/636

## 2018-07-30 PROCEDURE — 77030002933 HC SUT MCRYL J&J -A: Performed by: ORTHOPAEDIC SURGERY

## 2018-07-30 PROCEDURE — 77030010428: Performed by: ORTHOPAEDIC SURGERY

## 2018-07-30 PROCEDURE — 74011000250 HC RX REV CODE- 250

## 2018-07-30 PROCEDURE — A4565 SLINGS: HCPCS | Performed by: ORTHOPAEDIC SURGERY

## 2018-07-30 PROCEDURE — 77030011930 HC WND ARTHRO ABLT S&N -C: Performed by: ORTHOPAEDIC SURGERY

## 2018-07-30 PROCEDURE — 74011250636 HC RX REV CODE- 250/636: Performed by: ORTHOPAEDIC SURGERY

## 2018-07-30 PROCEDURE — 77030025635 HC SUT PASS XPRS J&J -C: Performed by: ORTHOPAEDIC SURGERY

## 2018-07-30 PROCEDURE — 77030006884 HC BLD SHV INCIS S&N -B: Performed by: ORTHOPAEDIC SURGERY

## 2018-07-30 PROCEDURE — 77030008463 HC STPLR SKN PROX J&J -B: Performed by: ORTHOPAEDIC SURGERY

## 2018-07-30 PROCEDURE — 76210000016 HC OR PH I REC 1 TO 1.5 HR: Performed by: ORTHOPAEDIC SURGERY

## 2018-07-30 PROCEDURE — 77030031139 HC SUT VCRL2 J&J -A: Performed by: ORTHOPAEDIC SURGERY

## 2018-07-30 PROCEDURE — 74011250636 HC RX REV CODE- 250/636: Performed by: NURSE ANESTHETIST, CERTIFIED REGISTERED

## 2018-07-30 PROCEDURE — 77030002960 HC SUT PASS S&N -C: Performed by: ORTHOPAEDIC SURGERY

## 2018-07-30 RX ORDER — LIDOCAINE HYDROCHLORIDE 20 MG/ML
INJECTION, SOLUTION EPIDURAL; INFILTRATION; INTRACAUDAL; PERINEURAL AS NEEDED
Status: DISCONTINUED | OUTPATIENT
Start: 2018-07-30 | End: 2018-07-30 | Stop reason: HOSPADM

## 2018-07-30 RX ORDER — OXYCODONE AND ACETAMINOPHEN 5; 325 MG/1; MG/1
1-2 TABLET ORAL
Qty: 50 TAB | Refills: 0 | Status: SHIPPED | OUTPATIENT
Start: 2018-07-30

## 2018-07-30 RX ORDER — SUCCINYLCHOLINE CHLORIDE 20 MG/ML
INJECTION INTRAMUSCULAR; INTRAVENOUS AS NEEDED
Status: DISCONTINUED | OUTPATIENT
Start: 2018-07-30 | End: 2018-07-30 | Stop reason: HOSPADM

## 2018-07-30 RX ORDER — ALBUTEROL SULFATE 0.83 MG/ML
2.5 SOLUTION RESPIRATORY (INHALATION)
Status: COMPLETED | OUTPATIENT
Start: 2018-07-30 | End: 2018-07-30

## 2018-07-30 RX ORDER — MIDAZOLAM HYDROCHLORIDE 1 MG/ML
2 INJECTION, SOLUTION INTRAMUSCULAR; INTRAVENOUS ONCE
Status: COMPLETED | OUTPATIENT
Start: 2018-07-30 | End: 2018-07-30

## 2018-07-30 RX ORDER — FAMOTIDINE 20 MG/1
20 TABLET, FILM COATED ORAL ONCE
Status: COMPLETED | OUTPATIENT
Start: 2018-07-30 | End: 2018-07-30

## 2018-07-30 RX ORDER — DEXAMETHASONE SODIUM PHOSPHATE 4 MG/ML
INJECTION, SOLUTION INTRA-ARTICULAR; INTRALESIONAL; INTRAMUSCULAR; INTRAVENOUS; SOFT TISSUE AS NEEDED
Status: DISCONTINUED | OUTPATIENT
Start: 2018-07-30 | End: 2018-07-30 | Stop reason: HOSPADM

## 2018-07-30 RX ORDER — LORAZEPAM 1 MG/1
0.5 TABLET ORAL AS NEEDED
Status: DISCONTINUED | OUTPATIENT
Start: 2018-07-30 | End: 2018-07-30 | Stop reason: HOSPADM

## 2018-07-30 RX ORDER — OXYCODONE AND ACETAMINOPHEN 5; 325 MG/1; MG/1
1 TABLET ORAL ONCE
Status: COMPLETED | OUTPATIENT
Start: 2018-07-30 | End: 2018-07-30

## 2018-07-30 RX ORDER — ALBUTEROL SULFATE 0.83 MG/ML
SOLUTION RESPIRATORY (INHALATION)
Status: COMPLETED
Start: 2018-07-30 | End: 2018-07-30

## 2018-07-30 RX ORDER — IPRATROPIUM BROMIDE AND ALBUTEROL SULFATE 2.5; .5 MG/3ML; MG/3ML
SOLUTION RESPIRATORY (INHALATION)
Status: COMPLETED
Start: 2018-07-30 | End: 2018-07-30

## 2018-07-30 RX ORDER — GLYCOPYRROLATE 0.2 MG/ML
INJECTION INTRAMUSCULAR; INTRAVENOUS AS NEEDED
Status: DISCONTINUED | OUTPATIENT
Start: 2018-07-30 | End: 2018-07-30 | Stop reason: HOSPADM

## 2018-07-30 RX ORDER — MIDAZOLAM HYDROCHLORIDE 1 MG/ML
INJECTION, SOLUTION INTRAMUSCULAR; INTRAVENOUS
Status: DISCONTINUED
Start: 2018-07-30 | End: 2018-07-30 | Stop reason: HOSPADM

## 2018-07-30 RX ORDER — IPRATROPIUM BROMIDE AND ALBUTEROL SULFATE 2.5; .5 MG/3ML; MG/3ML
3 SOLUTION RESPIRATORY (INHALATION)
Status: COMPLETED | OUTPATIENT
Start: 2018-07-30 | End: 2018-07-30

## 2018-07-30 RX ORDER — FENTANYL CITRATE 50 UG/ML
100 INJECTION, SOLUTION INTRAMUSCULAR; INTRAVENOUS ONCE
Status: COMPLETED | OUTPATIENT
Start: 2018-07-30 | End: 2018-07-30

## 2018-07-30 RX ORDER — SODIUM CHLORIDE, SODIUM LACTATE, POTASSIUM CHLORIDE, CALCIUM CHLORIDE 600; 310; 30; 20 MG/100ML; MG/100ML; MG/100ML; MG/100ML
75 INJECTION, SOLUTION INTRAVENOUS CONTINUOUS
Status: DISCONTINUED | OUTPATIENT
Start: 2018-07-31 | End: 2018-07-30 | Stop reason: HOSPADM

## 2018-07-30 RX ORDER — ONDANSETRON 2 MG/ML
INJECTION INTRAMUSCULAR; INTRAVENOUS AS NEEDED
Status: DISCONTINUED | OUTPATIENT
Start: 2018-07-30 | End: 2018-07-30 | Stop reason: HOSPADM

## 2018-07-30 RX ORDER — PROPOFOL 10 MG/ML
INJECTION, EMULSION INTRAVENOUS AS NEEDED
Status: DISCONTINUED | OUTPATIENT
Start: 2018-07-30 | End: 2018-07-30 | Stop reason: HOSPADM

## 2018-07-30 RX ORDER — FENTANYL CITRATE 50 UG/ML
INJECTION, SOLUTION INTRAMUSCULAR; INTRAVENOUS
Status: DISCONTINUED
Start: 2018-07-30 | End: 2018-07-30 | Stop reason: HOSPADM

## 2018-07-30 RX ORDER — CLINDAMYCIN PHOSPHATE 900 MG/50ML
900 INJECTION INTRAVENOUS
Status: COMPLETED | OUTPATIENT
Start: 2018-07-30 | End: 2018-07-30

## 2018-07-30 RX ADMIN — PROPOFOL 150 MG: 10 INJECTION, EMULSION INTRAVENOUS at 16:49

## 2018-07-30 RX ADMIN — FENTANYL CITRATE 50 MCG: 50 INJECTION, SOLUTION INTRAMUSCULAR; INTRAVENOUS at 15:10

## 2018-07-30 RX ADMIN — FAMOTIDINE 20 MG: 20 TABLET ORAL at 14:43

## 2018-07-30 RX ADMIN — DEXAMETHASONE SODIUM PHOSPHATE 4 MG: 4 INJECTION, SOLUTION INTRA-ARTICULAR; INTRALESIONAL; INTRAMUSCULAR; INTRAVENOUS; SOFT TISSUE at 16:58

## 2018-07-30 RX ADMIN — LIDOCAINE HYDROCHLORIDE 50 MG: 20 INJECTION, SOLUTION EPIDURAL; INFILTRATION; INTRACAUDAL; PERINEURAL at 16:49

## 2018-07-30 RX ADMIN — IPRATROPIUM BROMIDE AND ALBUTEROL SULFATE 3 ML: 2.5; .5 SOLUTION RESPIRATORY (INHALATION) at 18:52

## 2018-07-30 RX ADMIN — SUCCINYLCHOLINE CHLORIDE 100 MG: 20 INJECTION INTRAMUSCULAR; INTRAVENOUS at 16:49

## 2018-07-30 RX ADMIN — GLYCOPYRROLATE 0.2 MG: 0.2 INJECTION INTRAMUSCULAR; INTRAVENOUS at 16:39

## 2018-07-30 RX ADMIN — MIDAZOLAM HYDROCHLORIDE 2 MG: 1 INJECTION, SOLUTION INTRAMUSCULAR; INTRAVENOUS at 15:09

## 2018-07-30 RX ADMIN — ONDANSETRON 4 MG: 2 INJECTION INTRAMUSCULAR; INTRAVENOUS at 16:39

## 2018-07-30 RX ADMIN — PROPOFOL 50 MG: 10 INJECTION, EMULSION INTRAVENOUS at 17:07

## 2018-07-30 RX ADMIN — IPRATROPIUM BROMIDE AND ALBUTEROL SULFATE 3 ML: .5; 3 SOLUTION RESPIRATORY (INHALATION) at 18:52

## 2018-07-30 RX ADMIN — ALBUTEROL SULFATE 2.5 MG: 0.83 SOLUTION RESPIRATORY (INHALATION) at 18:34

## 2018-07-30 RX ADMIN — OXYCODONE HYDROCHLORIDE AND ACETAMINOPHEN 1 TABLET: 5; 325 TABLET ORAL at 20:01

## 2018-07-30 RX ADMIN — ALBUTEROL SULFATE 2.5 MG: 2.5 SOLUTION RESPIRATORY (INHALATION) at 18:34

## 2018-07-30 RX ADMIN — SODIUM CHLORIDE, SODIUM LACTATE, POTASSIUM CHLORIDE, AND CALCIUM CHLORIDE 75 ML/HR: 600; 310; 30; 20 INJECTION, SOLUTION INTRAVENOUS at 14:43

## 2018-07-30 RX ADMIN — CLINDAMYCIN PHOSPHATE 900 MG: 900 INJECTION INTRAVENOUS at 16:55

## 2018-07-30 NOTE — H&P
H&P Update: 
Jim Fall was seen and examined. History and physical has been reviewed. The patient has been examined. There have been no significant clinical changes since the completion of the originally dated History and Physical. 
Patient identified by surgeon; surgical site was confirmed by patient and surgeon. There was concern as to when he drank last initially, He stated he had coffee with cream at 430 and water at 11am.  This was confirmed with the family and . [employer]. This was discussed with Dr. Kane Florian. Heart RRR WNL Chest CTAB Proceed with surgery Signed By: Brandy Barker MD   
 July 30, 2018 4:09 PM

## 2018-07-30 NOTE — ANESTHESIA PROCEDURE NOTES
Peripheral Block Start time: 7/30/2018 3:07 PM 
End time: 7/30/2018 3:15 PM 
Performed by: Diana Matthews Authorized by: Diana Matthews Pre-procedure: Indications: at surgeon's request and post-op pain management Preanesthetic Checklist: patient identified, risks and benefits discussed, site marked, timeout performed, anesthesia consent given and patient being monitored Timeout Time: 15:09 Block Type:  
Block Type:  Brachial plexus and interscalene Monitoring:  Standard ASA monitoring, continuous pulse ox, responsive to questions, oxygen, frequent vital sign checks and heart rate Injection Technique:  Single shot Procedures: ultrasound guided Patient Position: seated Prep: chlorhexidine Needle Type:  Stimuplex Needle Gauge:  22 G Needle Localization:  Ultrasound guidance and nerve stimulator Motor Response: minimal motor response >0.4 mA Medication Injected:  0.5% 
ropivacaine Volume (mL):  30 
 
Assessment: 
Number of attempts:  1 Injection Assessment:  Incremental injection every 5 mL, negative aspiration for blood, local visualized surrounding nerve on ultrasound, no paresthesia, ultrasound image on chart and no intravascular symptoms Patient tolerance:  Patient tolerated the procedure well with no immediate complications For Vitals see nursing notes.   
 
Rupali Trevizo MD 
4:04 PM

## 2018-07-30 NOTE — IP AVS SNAPSHOT
[address] [place] [address] Joellen Breanne Escobar 
[phone] Patient: Bridget Arnett MRN: [id number]:[id number] About your hospitalization You were admitted on:  July 30, 2018 You last received care in the:  [facility] You were discharged on:  July 30, 2018 Why you were hospitalized Your primary diagnosis was:  Not on File Follow-up Information Follow up With Details Comments Contact Info MD Eugene Seaman [address] [place] [id number] 
[phone] Discharge Orders None A check bear indicates which time of day the medication should be taken. My Medications START taking these medications Instructions Each Dose to Equal  
 Morning Noon Evening Bedtime  
 oxyCODONE-acetaminophen 5-325 mg per tablet Commonly known as:  PERCOCET Your last dose was: Your next dose is: Take 1-2 Tabs by mouth every four (4) hours as needed for Pain. Max Daily Amount: 12 Tabs. 1-2 Tab CONTINUE taking these medications Instructions Each Dose to Equal  
 Morning Noon Evening Bedtime * azelastine 0.05 % ophthalmic solution Commonly known as:  OPTIVAR Your last dose was: Your next dose is:    
   
   
 Administer 1 Drop to both eyes two (2) times daily as needed. (allergies) 1 Drop * Azelastine 0.15 % (205.5 mcg) nasal spray Commonly known as:  ASTEPRO Your last dose was: Your next dose is: 2 Sprays by Both Nostrils route two (2) times daily as needed. (seasonal allergies) 2 Spray BYSTOLIC 5 mg tablet Generic drug:  nebivolol Your last dose was: Your next dose is: Take 1 Tab by mouth daily. 5 mg  
    
   
   
   
  
 clopidogrel 75 mg Tab Commonly known as:  PLAVIX Your last dose was: Your next dose is: Take 1 Tab by mouth daily. 75 mg  
    
   
   
   
  
 * fluticasone 50 mcg/actuation nasal spray Commonly known as:  Domnick Means Your last dose was: Your next dose is: 2 Sprays by Both Nostrils route daily. 2 Topsham * FLOVENT  mcg/actuation inhaler Generic drug:  fluticasone Your last dose was: Your next dose is: Take 1 Puff by inhalation as needed. 1 Puff LINZESS 290 mcg Cap capsule Generic drug:  linaclotide Your last dose was: Your next dose is: Take 1 Cap by mouth as needed. 1 Cap PROAIR HFA 90 mcg/actuation inhaler Generic drug:  albuterol Your last dose was: Your next dose is: Take 2 Puffs by inhalation as needed. 2 Puff * STIOLTO RESPIMAT IN Your last dose was: Your next dose is: Take  by inhalation. * tiotropium-olodaterol 2.5-2.5 mcg/actuation Mist  
Commonly known as:  Rollen Cardinal Your last dose was: Your next dose is: Take 2 Actuation(s) by inhalation daily. 2 Actuation(s)  
    
   
   
   
  
 tamsulosin 0.4 mg capsule Commonly known as:  FLOMAX Your last dose was: Your next dose is: Take 1 Cap by mouth daily. 0.4 mg  
    
   
   
   
  
 * Notice: This list has 6 medication(s) that are the same as other medications prescribed for you. Read the directions carefully, and ask your doctor or other care provider to review them with you. Where to Get Your Medications Information on where to get these meds will be given to you by the nurse or doctor. ! Ask your nurse or doctor about these medications  
  oxyCODONE-acetaminophen 5-325 mg per tablet Opioid Education Prescription Opioids: What You Need to Know: Prescription opioids can be used to help relieve moderate-to-severe pain and are often prescribed following a surgery or injury, or for certain health conditions. These medications can be an important part of treatment but also come with serious risks. Opioids are strong pain medicines. Examples include hydrocodone, oxycodone, fentanyl, and morphine. Heroin is an example of an illegal opioid. It is important to work with your health care provider to make sure you are getting the safest, most effective care. WHAT ARE THE RISKS AND SIDE EFFECTS OF OPIOID USE? Prescription opioids carry serious risks of addiction and overdose, especially with prolonged use. An opioid overdose, often marked by slow breathing, can cause sudden death. The use of prescription opioids can have a number of side effects as well, even when taken as directed. · Tolerance-meaning you might need to take more of a medication for the same pain relief · Physical dependence-meaning you have symptoms of withdrawal when the medication is stopped. Withdrawal symptoms can include nausea, sweating, chills, diarrhea, stomach cramps, and muscle aches. Withdrawal can last up to several weeks, depending on which drug you took and how long you took it. · Increased sensitivity to pain · Constipation · Nausea, vomiting, and dry mouth · Sleepiness and dizziness · Confusion · Depression · Low levels of testosterone that can result in lower sex drive, energy, and strength · Itching and sweating RISKS ARE GREATER WITH:      
· History of drug misuse, substance use disorder, or overdose · Mental health conditions (such as depression or anxiety) · Sleep apnea · Older age (72 years or older) · Pregnancy Avoid alcohol while taking prescription opioids. Also, unless specifically advised by your health care provider, medications to avoid include: · Benzodiazepines (such as Xanax or Valium) · Muscle relaxants (such as Soma or Flexeril) · Hypnotics (such as Ambien or Lunesta) · Other prescription opioids KNOW YOUR OPTIONS Talk to your health care provider about ways to manage your pain that don't involve prescription opioids. Some of these options may actually work better and have fewer risks and side effects. Options may include: 
· Pain relievers such as acetaminophen, ibuprofen, and naproxen · Some medications that are also used for depression or seizures · Physical therapy and exercise · Counseling to help patients learn how to cope better with triggers of pain and stress. · Application of heat or cold compress · Massage therapy · Relaxation techniques Be Informed Make sure you know the name of your medication, how much and how often to take it, and its potential risks & side effects. IF YOU ARE PRESCRIBED OPIOIDS FOR PAIN: 
· Never take opioids in greater amounts or more often than prescribed. Remember the goal is not to be pain-free but to manage your pain at a tolerable level. · Follow up with your primary care provider to: · Work together to create a plan on how to manage your pain. · Talk about ways to help manage your pain that don't involve prescription opioids. · Talk about any and all concerns and side effects. · Help prevent misuse and abuse. · Never sell or share prescription opioids · Help prevent misuse and abuse. · Store prescription opioids in a secure place and out of reach of others (this may include visitors, children, friends, and family). · Safely dispose of unused/unwanted prescription opioids: Find your community drug take-back program or your pharmacy mail-back program, or flush them down the toilet, following guidance from the Food and Drug Administration (www.fda.gov/Drugs/ResourcesForYou). · Visit www.cdc.gov/drugoverdose to learn about the risks of opioid abuse and overdose.  
· If you believe you may be struggling with addiction, tell your health care provider and ask for guidance or call Charli Hagen at 1-209-224-CCUX. Discharge Instructions DISCHARGE SUMMARY from Nurse PATIENT INSTRUCTIONS: 
 
After general anesthesia or intravenous sedation, for 24 hours or while taking prescription Narcotics: · Limit your activities · Do not drive and operate hazardous machinery · Do not make important personal or business decisions · Do  not drink alcoholic beverages · If you have not urinated within 8 hours after discharge, please contact your surgeon on call. Report the following to your surgeon: 
· Excessive pain, swelling, redness or odor of or around the surgical area · Temperature over 100.5 · Nausea and vomiting lasting longer than 4 hours or if unable to take medications · Any signs of decreased circulation or nerve impairment to extremity: change in color, persistent  numbness, tingling, coldness or increase pain · Any questions What to do at Home: 
Recommended activity: Activity as tolerated and no driving for today, These are general instructions for a healthy lifestyle: No smoking/ No tobacco products/ Avoid exposure to second hand smoke Surgeon General's Warning:  Quitting smoking now greatly reduces serious risk to your health. Obesity, smoking, and sedentary lifestyle greatly increases your risk for illness A healthy diet, regular physical exercise & weight monitoring are important for maintaining a healthy lifestyle You may be retaining fluid if you have a history of heart failure or if you experience any of the following symptoms:  Weight gain of 3 pounds or more overnight or 5 pounds in a week, increased swelling in our hands or feet or shortness of breath while lying flat in bed. Please call your doctor as soon as you notice any of these symptoms; do not wait until your next office visit.  
 
Recognize signs and symptoms of STROKE: 
 
 F-face looks uneven A-arms unable to move or move unevenly S-speech slurred or non-existent T-time-call 911 as soon as signs and symptoms begin-DO NOT go Back to bed or wait to see if you get better-TIME IS BRAIN. Warning Signs of HEART ATTACK Call 911 if you have these symptoms: 
? Chest discomfort. Most heart attacks involve discomfort in the center of the chest that lasts more than a few minutes, or that goes away and comes back. It can feel like uncomfortable pressure, squeezing, fullness, or pain. ? Discomfort in other areas of the upper body. Symptoms can include pain or discomfort in one or both arms, the back, neck, jaw, or stomach. ? Shortness of breath with or without chest discomfort. ? Other signs may include breaking out in a cold sweat, nausea, or lightheadedness. Don't wait more than five minutes to call [address]! Fast action can save your life. Calling 911 is almost always the fastest way to get lifesaving treatment. Emergency Medical Services staff can begin treatment when they arrive  up to an hour sooner than if someone gets to the hospital by car. The discharge information has been reviewed with the patient. The patient verbalized understanding. Discharge medications reviewed with the patient and appropriate educational materials and side effects teaching were provided. ___________________________________________________________________________________________________________________________________ Patient armband removed and given to patient to take home. Patient was informed of the privacy risks if armband lost or stolen Shoulder Arthroscopy: What to Expect at Home Your Recovery Your arm may be in a sling. You will feel tired for several days. Your shoulder will be swollen, and you may notice that your skin is a different color near the cuts the doctor made (incisions).  Your hand and arm may also be swollen. This is normal and will go away in a few days. Depending on the medicine you had during the surgery, your entire arm may feel numb or like you cannot move it. This goes away in 12 to 24 hours. When you can return to work or your usual routine will depend on your shoulder problem. Most people need 6 weeks or longer to recover. How much time you need depends on the surgery that was done. You may have to limit your activity until your shoulder strength and range of motion return to normal. You may also be in a rehabilitation program (rehab). If you have a desk job, you may be able to return to work a few days after the surgery. If you lift things at work, it may take months before you return to work. This care sheet gives you a general idea about how long it will take for you to recover. But each person recovers at a different pace. Follow the steps below to get better as quickly as possible. How can you care for yourself at home? Activity 
  · Rest when you feel tired. Getting enough sleep will help you recover. You may be more comfortable if you sleep in a reclining chair. To make your arm and shoulder feel better, keep a thin pillow under the back of your arm while you are lying down.  
  · Try to walk each day. Start by walking a little more than you did the day before. Bit by bit, increase the amount you walk. Walking boosts blood flow and helps prevent pneumonia and constipation.  
  · For 2 to 3 weeks, avoid lifting anything heavier than a plate or a glass. You need to give your shoulder time to heal.  
  · Your arm may be in a sling. You may need to use the sling for a few days to a few weeks. Your doctor will advise you on how long to wear the sling.  
  · You may take the sling off when you dress or wash.  
  · Do not use your arm for repeated movements. These include painting, vacuuming, or using a computer. Diet 
  · You can eat your normal diet.  If your stomach is upset, try bland, low-fat foods like plain rice, broiled chicken, toast, and yogurt.  
  · Drink plenty of fluids, unless your doctor tells you not to.  
  · You may notice that your bowel movements are not regular right after your surgery. This is common. Try to avoid constipation and straining with bowel movements. You may want to take a fiber supplement every day. If you have not had a bowel movement after a couple of days, ask your doctor about taking a mild laxative. Medicines 
  · Your doctor will tell you if and when you can restart your medicines. He or she will also give you instructions about taking any new medicines.  
  · If you take blood thinners, such as warfarin (Coumadin), clopidogrel (Plavix), or aspirin, be sure to talk to your doctor. He or she will tell you if and when to start taking those medicines again. Make sure that you understand exactly what your doctor wants you to do.  
  · Take pain medicines exactly as directed. ¨ If the doctor gave you a prescription medicine for pain, take it as prescribed. ¨ If you are not taking a prescription pain medicine, ask your doctor if you can take an over-the-counter medicine.  
  · If you think your pain medicine is making you sick to your stomach: 
¨ Take your medicine after meals (unless your doctor has told you not to). ¨ Ask your doctor for a different pain medicine.  
  · If your doctor prescribed antibiotics, take them as directed. Do not stop taking them just because you feel better. You need to take the full course of antibiotics. Incision care 
  · If you have a dressing over your incision, keep it clean and dry. You may remove it 2 to 3 days after the surgery.  
  · If your incision is open to the air, keep the area clean and dry.  
  · If you have strips of tape on the incision, leave the tape on for a week or until it falls off. Exercise 
  · You may need rehabilitation.  This is a series of exercises you do after your surgery. Rehab helps you get back your shoulder's range of motion and strength. You will work with your doctor and physical therapist to plan this exercise program. To get the best results, you need to do the exercises correctly and as often and as long as your doctor tells you.  
 Ice 
  · To reduce swelling and pain, put ice or a cold pack on your shoulder for 10 to 20 minutes at a time. Do this every 1 to 2 hours. Put a thin cloth between the ice and your skin. Follow-up care is a key part of your treatment and safety. Be sure to make and go to all appointments, and call your doctor if you are having problems. It's also a good idea to know your test results and keep a list of the medicines you take. When should you call for help? Call 911 anytime you think you may need emergency care. For example, call if: 
  · You passed out (lost consciousness).  
  · You have severe trouble breathing.  
  · You have sudden chest pain and shortness of breath, or you cough up blood.  
 Call your doctor now or seek immediate medical care if: 
  · Your hand is cool, pale, or numb, or it changes color.  
  · You are unable to move your fingers, wrist, or elbow.  
  · You are sick to your stomach or cannot keep fluids down.  
  · You have pain that does not get better after you take pain medicine.  
  · You have signs of infection, such as: 
¨ Increased pain, swelling, warmth, or redness. ¨ Red streaks leading from the incision. ¨ Pus draining from the incision. ¨ A fever.  
  · You have loose stitches, or your incision comes open.  
  · Your incision bleeds through your first dressing or is still bleeding 3 days after your surgery.  
 Watch closely for changes in your health, and be sure to contact your doctor if: 
  · Your sling feels too tight, and you cannot loosen it.  
  · You have new or increased swelling in your arm.  
  · You have new pain that develops in another area of the affected limb. For example, you have pain in your hand or elbow.  
  · You do not have a bowel movement after taking a laxative.  
  · You do not get better as expected. Where can you learn more? Go to http://blanca-zbigniew.info/. Enter P476 in the search box to learn more about \"Shoulder Arthroscopy: What to Expect at Home. \" Current as of: November 29, 2017 Content Version: 11.7 © 0231-9421 [employer]. Care instructions adapted under license by [employer] (which disclaims liability or warranty for this information). If you have questions about a medical condition or this instruction, always ask your healthcare professional. [address] any warranty or liability for your use of this information. Introducing \[facility] [place] [facility]\"" & HEALTH SERVICES! [facility] introduces Househappy patient portal. Now you can access parts of your medical record, email your doctor's office, and request medication refills online. 1. In your internet browser, go to [email or web address] 2. Click on the First Time User? Click Here link in the Sign In box. You will see the New Member Sign Up page. 3. Enter your Househappy Access Code exactly as it appears below. You will not need to use this code after youve completed the sign-up process. If you do not sign up before the expiration date, you must request a new code. · Househappy Access Code: [email or web address] Expires: 9/11/2018  4:27 PM 
 
4. Enter the last four digits of your Social Security Number (xxxx) and Date of Birth (mm/dd/yyyy) as indicated and click Submit. You will be taken to the next sign-up page. 5. Create a [employer] ID. This will be your Househappy login ID and cannot be changed, so think of one that is secure and easy to remember. 6. Create a Househappy password. You can change your password at any time. 7. Enter your Password Reset Question and Answer.  This can be used at a later time if you forget your password. 8. Enter your e-mail address. You will receive e-mail notification when new information is available in [address] 9. Click Sign Up. You can now view and download portions of your medical record. 10. Click the Download Summary menu link to download a portable copy of your medical information. If you have questions, please visit the Frequently Asked Questions section of the taggat website. Remember, Incentive is NOT to be used for urgent needs. For medical emergencies, dial 911. Now available from your iPhone and Android! Introducing Александр Valiente As a [employer] patient, I wanted to make you aware of our electronic visit tool called Александр Valiente. Konnektid/Qmerce allows you to connect within minutes with a medical provider 24 hours a day, seven days a week via a mobile device or tablet or logging into a secure website from your computer. You can access Александр Valiente from anywhere in the [place]. A virtual visit might be right for you when you have a simple condition and feel like you just dont want to get out of bed, or cant get away from work for an appointment, when your regular [facility] provider is not available (evenings, weekends or holidays), or when youre out of town and need minor care. Electronic visits cost only $49 and if the Konnektid/Qmerce provider determines a prescription is needed to treat your condition, one can be electronically transmitted to a nearby pharmacy*. Please take a moment to enroll today if you have not already done so. The enrollment process is free and takes just a few minutes. To enroll, please download the [employer]ktid/Qmerce teresa to your tablet or phone, or visit [email or web address] to enroll on your computer.    
And, as an [address] [place] [address] patient with a Invesdor account, the results of your visits will be scanned into your electronic medical record and your primary care provider will be able to view the scanned results. We urge you to continue to see your regular [facility] provider for your ongoing medical care. And while your primary care provider may not be the one available when you seek a Александр Augustinfin virtual visit, the peace of mind you get from getting a real diagnosis real time can be priceless. For more information on The Point, view our Frequently Asked Questions (FAQs) at [email or web address]. Sincerely, 
 
Sepideh Thomas MD 
Chief Medical Officer [facility] Ledy Sotero *:  certain medications cannot be prescribed via [employer] Point Providers Seen During Your Hospitalization Provider Specialty Primary office phone Pedro Luis Vega MD Orthopedic Surgery [phone] Your Primary Care Physician (PCP) Primary Care Physician Office Phone Office Fax Nghia Vines [phone] You are allergic to the following Allergen Reactions Keflex (Cephalexin) Hives Morphine Hives Swelling Affected breathing Recent Documentation Height Weight BMI Smoking Status 1.448 m 58.5 kg 27.92 kg/m2 Current Every Day Smoker Emergency Contacts Name Discharge Info Relation Home Work Mobile 4 [facility] CAREGIVER [3] Spouse [3] [phone] [address] [address] CAREGIVER [3] Son [22]   [phone] Patient Belongings The following personal items are in your possession at time of discharge: 
  Dental Appliances: None  Visual Aid: Glasses      Home Medications: None   Jewelry: None  Clothing: Shirt, Pants, Robe, Footwear, Undergarments, Socks Please provide this summary of care documentation to your next provider. Signatures-by signing, you are acknowledging that this After Visit Summary has been reviewed with you and you have received a copy. Patient Signature:  ____________________________________________________________ Date:  ____________________________________________________________  
  
Rich Bettie Provider Signature:  ____________________________________________________________ Date:  ____________________________________________________________

## 2018-07-30 NOTE — OP NOTES
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 PROCEDURES PERFORMED:  1. Right shoulder arthroscopy with arthroscopic subacromial decompression. 2.  Radha acromioclavicular joint resection, 10 mm distal clavicle. 3.  Mini open rotator cuff repair with double-row repair. SURGEON:  German Braden MD     ASSISTANT:  Eva Diaz    ANESTHESIA:  General with block. ESTIMATED BLOOD LOSS:  Minimal.    SPECIMENS REMOVED:  None. COMPLICATIONS:  None. IMPLANTS:  Healix Advance BR3 suture anchor x2, 5.5 mm MultiFix Ultra Smith and Nephew anchor x2. INDICATION FOR PROCEDURE:  The patient is a 75-year-old white male with a history of right AC joint DJD. There was concern for impingement. He was found to have pain. He was brought to the operative suite for definitive management. Full informed consent was obtained. Risks and benefits explained in full including but not limited to bleeding, infection, nerve damage, pain, stiffness, swelling, further surgery, revision surgery, incomplete resolution of symptoms. DESCRIPTION OF PROCEDURE:  The patient was brought to the operative suite, placed on the table in the supine position. General endotracheal anesthesia was done after giving interscalene block. We proceeded to place in a lateral decubitus position. All prominent points were padded. His right arm was placed in 10 pounds of traction, proceeded to prep and drape in normal sterile fashion.   We then proceeded to make a standard posterior portal.  Anterior portal was made under direct visualization to the anterior interval.  I proceeded to explore the shoulder with the following findings:  Glenohumeral joint was unremarkable. Anterior labrum was unremarkable. Posterior labrum was unremarkable. No arthritis was noted. He was found to have a large rotator cuff tear 2 cm x 2 cm at the undersurface with intact biceps. A large subacromial spur was appreciated, as well as notable AC joint DJD. After this was done, we proceeded to turn our attention to the subacromial space. A lateral portal was made. I proceeded to perform an extensive decompression of the bursa with the bursal and subacromial decompression from the posterior portal.  From posterior to anterior, medial to lateral, full decompression was appreciated. After this was done, we proceeded to ID [facility] joint through separate anterior portal, performed an [facility] joint resection from anterior to posterior, inferior and superior, full resection was appreciated without residual impingement of the distal clavicle on the acromion. I then proceeded to ID the rotator cuff due to the large size of the tear to make a mini open incision measuring 1.5 cm, dissected down through the skin. Deltoid was divided bluntly. I identified the rotator cuff tear. I proceeded to then debride the rotator cuff tear and the greater tuberosity. Two medial row anchors were placed, essentially triple-loaded anchors. These were passed in mattress type fashion to the rotator cuff. The rotator cuff was repaired down to the greater tuberosity. After this was done, I proceeded to do a double-row repair to reinforce the construct and get rid of the dog ear and place a full sheath of tendon over the greater tuberosity. Adequate rotator cuff repair was appreciated, was found to be watertight. I proceeded to copiously irrigate the shoulder. All instruments were removed from the shoulder. Deltoid was closed loosely with 2-0 Vicryl stitch. Subcutaneous tissue was closed with 2-0 Vicryl stitch and Monocryl.   Dry sterile dressing was placed. Anterior and posterior portals were closed with nylon. Dry sterile dressing was placed. Was awoke from anesthesia and placed in a sling immobilizer, brought to postop care in stable condition.       MD JAMARI Hayes / CARMINA  D: 07/30/2018 18:01     T: 07/30/2018 18:25  JOB #: [id number]

## 2018-07-30 NOTE — ANESTHESIA PREPROCEDURE EVALUATION
Anesthetic History No history of anesthetic complications Review of Systems / Medical History Patient summary reviewed and pertinent labs reviewed Pulmonary COPD: moderate Asthma Neuro/Psych Within defined limits Cardiovascular Within defined limits Exercise tolerance: >4 METS 
  
GI/Hepatic/Renal 
Within defined limits Endo/Other Within defined limits Other Findings Comments: Documentation of current medication Current medications obtained, documented and obtained? YES Risk Factors for Postoperative nausea/vomiting: 
     History of postoperative nausea/vomiting? NO Female? NO Motion sickness? NO Intended opioid administration for postoperative analgesia? YES Smoking Abstinence: 
Current Smoker? YES Elective Surgery? YES Seen preoperatively by anesthesiologist or proxy prior to day of surgery? YES Pt abstained from smoking 24 hours prior to anesthesia? NO Preventive care/screening for High Blood Pressure: 
Aged 18 years and older: YES Screened for high blood pressure: YES Patients with high blood pressure referred to primary care provider 
 for BP management: YES Physical Exam 
 
Airway Mallampati: II 
TM Distance: 4 - 6 cm Neck ROM: normal range of motion Mouth opening: Normal 
 
 Cardiovascular Regular rate and rhythm,  S1 and S2 normal,  no murmur, click, rub, or gallop Rhythm: regular Rate: normal 
 
 
 
 Dental 
 
Dentition: Full upper dentures and Lower partial plate Comments: 2 native lower teeth Pulmonary Breath sounds clear to auscultation Abdominal 
GI exam deferred Other Findings Anesthetic Plan ASA: 3 Anesthesia type: general and regional - interscalene block Induction: Intravenous Anesthetic plan and risks discussed with: Patient, Spouse, Son / Daughter and Family

## 2018-07-30 NOTE — DISCHARGE INSTRUCTIONS
DISCHARGE SUMMARY from Nurse    PATIENT INSTRUCTIONS:    After general anesthesia or intravenous sedation, for 24 hours or while taking prescription Narcotics:  · Limit your activities  · Do not drive and operate hazardous machinery  · Do not make important personal or business decisions  · Do  not drink alcoholic beverages  · If you have not urinated within 8 hours after discharge, please contact your surgeon on call. Report the following to your surgeon:  · Excessive pain, swelling, redness or odor of or around the surgical area  · Temperature over 100.5  · Nausea and vomiting lasting longer than 4 hours or if unable to take medications  · Any signs of decreased circulation or nerve impairment to extremity: change in color, persistent  numbness, tingling, coldness or increase pain  · Any questions    What to do at Home:  Recommended activity: Activity as tolerated and no driving for today,  These are general instructions for a healthy lifestyle:    No smoking/ No tobacco products/ Avoid exposure to second hand smoke  Surgeon General's Warning:  Quitting smoking now greatly reduces serious risk to your health. Obesity, smoking, and sedentary lifestyle greatly increases your risk for illness    A healthy diet, regular physical exercise & weight monitoring are important for maintaining a healthy lifestyle    You may be retaining fluid if you have a history of heart failure or if you experience any of the following symptoms:  Weight gain of 3 pounds or more overnight or 5 pounds in a week, increased swelling in our hands or feet or shortness of breath while lying flat in bed. Please call your doctor as soon as you notice any of these symptoms; do not wait until your next office visit.     Recognize signs and symptoms of STROKE:    F-face looks uneven    A-arms unable to move or move unevenly    S-speech slurred or non-existent    T-time-call 911 as soon as signs and symptoms begin-DO NOT go       Back to bed or wait to see if you get better-TIME IS BRAIN. Warning Signs of HEART ATTACK     Call 911 if you have these symptoms:   Chest discomfort. Most heart attacks involve discomfort in the center of the chest that lasts more than a few minutes, or that goes away and comes back. It can feel like uncomfortable pressure, squeezing, fullness, or pain.  Discomfort in other areas of the upper body. Symptoms can include pain or discomfort in one or both arms, the back, neck, jaw, or stomach.  Shortness of breath with or without chest discomfort.  Other signs may include breaking out in a cold sweat, nausea, or lightheadedness. Don't wait more than five minutes to call 911 - MINUTES MATTER! Fast action can save your life. Calling 911 is almost always the fastest way to get lifesaving treatment. Emergency Medical Services staff can begin treatment when they arrive -- up to an hour sooner than if someone gets to the hospital by car. The discharge information has been reviewed with the patient. The patient verbalized understanding. Discharge medications reviewed with the patient and appropriate educational materials and side effects teaching were provided. ___________________________________________________________________________________________________________________________________  Patient armband removed and given to patient to take home. Patient was informed of the privacy risks if armband lost or stolen         Shoulder Arthroscopy: What to Expect at [address] arm may be in a sling. You will feel tired for several days. Your shoulder will be swollen, and you may notice that your skin is a different color near the cuts the doctor made (incisions). Your hand and arm may also be swollen. This is normal and will go away in a few days. Depending on the medicine you had during the surgery, your entire arm may feel numb or like you cannot move it. This goes away in 12 to 24 hours.   When you can return to work or your usual routine will depend on your shoulder problem. Most people need 6 weeks or longer to recover. How much time you need depends on the surgery that was done. You may have to limit your activity until your shoulder strength and range of motion return to normal. You may also be in a rehabilitation program (rehab). If you have a desk job, you may be able to return to work a few days after the surgery. If you lift things at work, it may take months before you return to work. This care sheet gives you a general idea about how long it will take for you to recover. But each person recovers at a different pace. Follow the steps below to get better as quickly as possible. How can you care for yourself at home? Activity    · Rest when you feel tired. Getting enough sleep will help you recover. You may be more comfortable if you sleep in a reclining chair. To make your arm and shoulder feel better, keep a thin pillow under the back of your arm while you are lying down.     · Try to walk each day. Start by walking a little more than you did the day before. Bit by bit, increase the amount you walk. Walking boosts blood flow and helps prevent pneumonia and constipation.     · For 2 to 3 weeks, avoid lifting anything heavier than a plate or a glass. You need to give your shoulder time to heal.     · Your arm may be in a sling. You may need to use the sling for a few days to a few weeks. Your doctor will advise you on how long to wear the sling.     · You may take the sling off when you dress or wash.     · Do not use your arm for repeated movements. These include painting, vacuuming, or using a computer. Diet    · You can eat your normal diet. If your stomach is upset, try bland, low-fat foods like plain rice, broiled chicken, toast, and yogurt.     · Drink plenty of fluids, unless your doctor tells you not to.     · You may notice that your bowel movements are not regular right after your surgery. This is common. Try to avoid constipation and straining with bowel movements. You may want to take a fiber supplement every day. If you have not had a bowel movement after a couple of days, ask your doctor about taking a mild laxative. Medicines    · Your doctor will tell you if and when you can restart your medicines. He or she will also give you instructions about taking any new medicines.     · If you take blood thinners, such as warfarin (Coumadin), clopidogrel (Plavix), or aspirin, be sure to talk to your doctor. He or she will tell you if and when to start taking those medicines again. Make sure that you understand exactly what your doctor wants you to do.     · Take pain medicines exactly as directed. ¨ If the doctor gave you a prescription medicine for pain, take it as prescribed. ¨ If you are not taking a prescription pain medicine, ask your doctor if you can take an over-the-counter medicine.     · If you think your pain medicine is making you sick to your stomach:  ¨ Take your medicine after meals (unless your doctor has told you not to). ¨ Ask your doctor for a different pain medicine.     · If your doctor prescribed antibiotics, take them as directed. Do not stop taking them just because you feel better. You need to take the full course of antibiotics. Incision care    · If you have a dressing over your incision, keep it clean and dry. You may remove it 2 to 3 days after the surgery.     · If your incision is open to the air, keep the area clean and dry.     · If you have strips of tape on the incision, leave the tape on for a week or until it falls off. Exercise    · You may need rehabilitation. This is a series of exercises you do after your surgery. Rehab helps you get back your shoulder's range of motion and strength.  You will work with your doctor and physical therapist to plan this exercise program. To get the best results, you need to do the exercises correctly and as often and as long as your doctor tells you.    Ice    · To reduce swelling and pain, put ice or a cold pack on your shoulder for 10 to 20 minutes at a time. Do this every 1 to 2 hours. Put a thin cloth between the ice and your skin. Follow-up care is a key part of your treatment and safety. Be sure to make and go to all appointments, and call your doctor if you are having problems. It's also a good idea to know your test results and keep a list of the medicines you take. When should you call for help? Call 911 anytime you think you may need emergency care. For example, call if:    · You passed out (lost consciousness).     · You have severe trouble breathing.     · You have sudden chest pain and shortness of breath, or you cough up blood.    Call your doctor now or seek immediate medical care if:    · Your hand is cool, pale, or numb, or it changes color.     · You are unable to move your fingers, wrist, or elbow.     · You are sick to your stomach or cannot keep fluids down.     · You have pain that does not get better after you take pain medicine.     · You have signs of infection, such as:  ¨ Increased pain, swelling, warmth, or redness. ¨ Red streaks leading from the incision. ¨ Pus draining from the incision. ¨ A fever.     · You have loose stitches, or your incision comes open.     · Your incision bleeds through your first dressing or is still bleeding 3 days after your surgery.    Watch closely for changes in your health, and be sure to contact your doctor if:    · Your sling feels too tight, and you cannot loosen it.     · You have new or increased swelling in your arm.     · You have new pain that develops in another area of the affected limb. For example, you have pain in your hand or elbow.     · You do not have a bowel movement after taking a laxative.     · You do not get better as expected. Where can you learn more? Go to http://blanca-zbigniew.info/.   Enter T000 in the search box to learn more about \"Shoulder Arthroscopy: What to Expect at Home. \"  Current as of: November 29, 2017  Content Version: 11.7  © 9344-7950 [employer]. Care instructions adapted under license by [employer] (which disclaims liability or warranty for this information). If you have questions about a medical condition or this instruction, always ask your healthcare professional. [address] any warranty or liability for your use of this information.

## 2018-07-30 NOTE — BRIEF OP NOTE
BRIEF OPERATIVE NOTE Date of Procedure: 7/30/2018 Preoperative Diagnosis: m19.011  djd right ac joint Postoperative Diagnosis:  Same with impingement and RC tear Procedure(s): 
right SHOULDER ARTHROSCOPY with SUBACROMIAL DECOMPRESSION and marv and mini-open RC Repair Surgeon(s) and Role: 
   * Brett Caballero MD - Primary Surgical Assistant: Earlene Robins Surgical Staff: 
Circ-1: Susie Wheat Scrub Tech-1: Howie Meigs Surg Asst-1: Angela Baez Event Time In Incision Start [phone] Incision Close Anesthesia: General and block Estimated Blood Loss: minimal 
Specimens: none Findings: rc tear, djd, dictated 198749 Complications: none Implants:  
Implant Name Type Inv. Item Serial No.  Lot No. LRB No. Used Action  
healix advance,br3 suture anchor    [employer] [id number] Right 2 Implanted 5.5mm multifix s ultra         [id number] Right 2 Implanted

## 2018-07-31 ENCOUNTER — PATIENT OUTREACH (OUTPATIENT)
Dept: INTERNAL MEDICINE CLINIC | Age: 68
End: 2018-07-31

## 2018-07-31 RX ORDER — LEVALBUTEROL TARTRATE 45 UG/1
2 AEROSOL, METERED ORAL
Qty: 1 INHALER | Refills: 3 | Status: SHIPPED | OUTPATIENT
Start: 2018-07-31 | End: 2018-07-31 | Stop reason: SDUPTHER

## 2018-09-11 DIAGNOSIS — J44.9 CHRONIC OBSTRUCTIVE PULMONARY DISEASE, UNSPECIFIED COPD TYPE (HCC): ICD-10-CM

## 2022-01-20 NOTE — ANESTHESIA POSTPROCEDURE EVALUATION
Post-Anesthesia Evaluation & Assessment Visit Vitals  BP (!) 125/96  Pulse 92  Temp 36.1 °C (97 °F)  Resp 20  
 Ht 4' 9\" (1.448 m)  Wt 58.5 kg (129 lb)  SpO2 91%  BMI 27.92 kg/m2 Nausea/Vomiting: no nausea and no vomiting Post-operative hydration adequate. Pain score (VAS): 0 Mental status & Level of consciousness: alert and oriented x 3 Neurological status: moves all extremities, sensation grossly intact Pulmonary status: airway patent, no supplemental oxygen required Complications related to anesthesia: none Patient has met all discharge requirements. Additional comments: 
 
 
 
Jenae Christiansen CRNA July 30, 2018Post-Anesthesia Evaluation and Assessment Patient: Sara Ramos MRN: [id number]  SSN: [id number] YOB: 1950  Age: 77 y.o. Sex: male Data from PACU flowsheet Cardiovascular Function/Vital Signs Visit Vitals  BP (!) 125/96  Pulse 92  Temp 36.1 °C (97 °F)  Resp 20  
 Ht 4' 9\" (1.448 m)  Wt 58.5 kg (129 lb)  SpO2 91%  BMI 27.92 kg/m2 Patient is status post general, regional anesthesia for Procedure(s): 
right SHOULDER ARTHROSCOPY with open SUBACROMIAL DECOMPRESSION and marv. Nausea/Vomiting: controlled Postoperative hydration reviewed and adequate. Pain: 
Pain Scale 1: Numeric (0 - 10) (07/30/18 1921) Pain Intensity 1: 0 (07/30/18 1921) Managed Mental Status and Level of Consciousness: Alert and oriented Pulmonary Status:  
O2 Device: Nasal cannula (07/30/18 1921) Adequate oxygenation and airway patent Complications related to anesthesia: None Post-anesthesia assessment completed. No concerns Signed By: Jenae Christiansen CRNA July 30, 2018 Patient unable to complete

## 2022-04-02 ASSESSMENT — VISUAL ACUITY
OS_CC: J1+
OD_GLARE: 20/100
OS_CC: 20/25
OS_GLARE: 20/100
OS_GLARE: 20/100
OS_CC: J1+
OS_SC: 20/30
OD_SC: 20/25-2
OD_SC: 20/25-2
OD_CC: 20/50
OS_SC: 20/30
OD_CC: J1+
OD_CC: 20/30
OD_CC: J1+
OD_GLARE: 20/100
OS_CC: 20/30

## 2022-04-02 ASSESSMENT — KERATOMETRY
OS_K1POWER_DIOPTERS: 44.50
OS_AXISANGLE2_DEGREES: 091
OD_K1POWER_DIOPTERS: 44.50
OS_AXISANGLE_DEGREES: 001
OS_K2POWER_DIOPTERS: 42.50
OD_AXISANGLE2_DEGREES: 113
OD_AXISANGLE_DEGREES: 023
OD_K2POWER_DIOPTERS: 42.25

## 2022-04-02 ASSESSMENT — TONOMETRY
OS_IOP_MMHG: 20
OS_IOP_MMHG: 30
OD_IOP_MMHG: 23
OD_IOP_MMHG: 22
OD_IOP_MMHG: 20
OS_IOP_MMHG: 25

## (undated) DEVICE — SUPER TURBOVAC 90 INTEGRATED CABLE WAND ICW: Brand: COBLATION

## (undated) DEVICE — 4.5 MM FULL RADIUS STRAIGHT                                    BLADES, POWER/EP-1, YELLOW, PACKAGED                                    6 PER BOX, STERILE: Brand: DYONICS

## (undated) DEVICE — KENDALL SCD EXPRESS SLEEVES, KNEE LENGTH, MEDIUM: Brand: KENDALL SCD

## (undated) DEVICE — DEPAUL KNEE ARTHROSCOPY PACK: Brand: MEDLINE INDUSTRIES, INC.

## (undated) DEVICE — SHOULDER SUSPENSION KIT 6 PER BOX

## (undated) DEVICE — BANDAGE,GAUZE,BULKEE II,4.5"X4.1YD,STRL: Brand: MEDLINE

## (undated) DEVICE — SUTURE ETHLN SZ 2-0 L18IN NONABSORBABLE BLK L26MM PS 3/8 585H

## (undated) DEVICE — STAPLER SKIN H3.9MM WIRE DIA0.58MM CRWN 6.9MM 35 STPL ROT

## (undated) DEVICE — DRAPE,U/ SHT,SPLIT,PLAS,STERIL: Brand: MEDLINE

## (undated) DEVICE — PREP SKN CHLRAPRP 26ML TNT -- CONVERT TO ITEM 373320

## (undated) DEVICE — DRAPE,REIN 53X77,STERILE: Brand: MEDLINE

## (undated) DEVICE — SYSTEM PRP 60CC USE OF CNTRFUG FOR PLT RICH PLSM ACCELERATE

## (undated) DEVICE — REM POLYHESIVE ADULT PATIENT RETURN ELECTRODE: Brand: VALLEYLAB

## (undated) DEVICE — 4.5 MM HELICUT STRAIGHT BURRS,                                    POWER/EP-1, SLATE, 5000 MAXIMUM RPM,                                    PACKAGED 6 PER BOX, STERILE: Brand: DYONICS HELICUT

## (undated) DEVICE — 5-IN-1 BARBED CONNECTOR POLYPROPYLENE 3/16 - 9/16 IN. (5 - 14.3 MM): Brand: ARGYLE

## (undated) DEVICE — 3M™ STERI-DRAPE™ U-DRAPE 1015: Brand: STERI-DRAPE™

## (undated) DEVICE — FIRSTPASS ST SUTURE PASSER, SELF CAPTURE: Brand: FIRSTPASS

## (undated) DEVICE — CANNULA THREADED FLEX 6.5 X 72MM: Brand: CLEAR-TRAC

## (undated) DEVICE — [FOUR SPIKE IRRIGATOR SET,  NON-PYROGENIC FLUID PATH,  DO NOT USE IF PACKAGE IS DAMAGED]

## (undated) DEVICE — TOWEL SURG W16XL26IN BLU NONFENESTRATED DLX ST 2 PER PK

## (undated) DEVICE — SUTURE ABSORBABLE BRAIDED 2-0 CT-1 27 IN UD VICRYL J259H

## (undated) DEVICE — OCCLUSIVE GAUZE STRIP,3% BISMUTH TRIBROMOPHENATE IN PETROLATUM BLEND: Brand: XEROFORM

## (undated) DEVICE — KERLIX BANDAGE ROLL: Brand: KERLIX

## (undated) DEVICE — 3L THIN WALL CAN: Brand: CRD

## (undated) DEVICE — CANNULA SMOOTH FLEX 8.0 X 72MM: Brand: CLEAR-TRAC

## (undated) DEVICE — UNIV CANN 5MM/76MM LTX FREE (10) BLUE

## (undated) DEVICE — SHOULDER IMMOBILIZER: Brand: DEROYAL

## (undated) DEVICE — PREP CHLORAPREP 10.5 ML ORG --

## (undated) DEVICE — GOWN,AURORA,NON-REINFORCED,2XL: Brand: MEDLINE

## (undated) DEVICE — SUT PASS EXPRESSSEW III W/NDL -- PK/5

## (undated) DEVICE — SOL IRR L R 3000ML BG --

## (undated) DEVICE — CANNULA THREADED FLEX 8.0 X 72MM: Brand: CLEAR-TRAC

## (undated) DEVICE — SUTURE MCRYL SZ 4-0 L18IN ABSRB UD L19MM PS-2 3/8 CIR PRIM Y496G

## (undated) DEVICE — 3.5 MM STARVAC 90 INTEGRATED CABLE WAND ICW, 90 DEGREE: Brand: COBLATION

## (undated) DEVICE — MEDI-VAC NON-CONDUCTIVE SUCTION TUBING 6MM X 6.1M (20 FT.) L: Brand: CARDINAL HEALTH